# Patient Record
Sex: FEMALE | Race: WHITE | NOT HISPANIC OR LATINO | Employment: FULL TIME | URBAN - METROPOLITAN AREA
[De-identification: names, ages, dates, MRNs, and addresses within clinical notes are randomized per-mention and may not be internally consistent; named-entity substitution may affect disease eponyms.]

---

## 2017-12-06 LAB — RAPID GROUP A STREP (OHS): NEGATIVE

## 2017-12-13 LAB
INFLUENZA A ANTIGEN, POC: NEGATIVE
INFLUENZA B ANTIGEN, POC: NEGATIVE

## 2020-02-11 LAB
INFLUENZA A ANTIGEN, POC: NEGATIVE
INFLUENZA B ANTIGEN, POC: NEGATIVE

## 2020-09-23 LAB — RAPID GROUP A STREP (OHS): NEGATIVE

## 2020-09-25 ENCOUNTER — HISTORICAL (OUTPATIENT)
Dept: URGENT CARE | Facility: CLINIC | Age: 35
End: 2020-09-25

## 2020-11-09 ENCOUNTER — HISTORICAL (OUTPATIENT)
Dept: URGENT CARE | Facility: CLINIC | Age: 35
End: 2020-11-09

## 2020-11-09 LAB
BILIRUB SERPL-MCNC: NEGATIVE MG/DL
BLOOD URINE, POC: NEGATIVE
CLARITY, POC UA: NORMAL
COLOR, POC UA: YELLOW
KETONES UR QL STRIP: NEGATIVE
LEUKOCYTE EST, POC UA: NORMAL
NITRITE, POC UA: NEGATIVE
PH, POC UA: 5
PROTEIN, POC: NORMAL
SPECIFIC GRAVITY, POC UA: 1.02
UROBILINOGEN, POC UA: NORMAL

## 2020-11-11 LAB — FINAL CULTURE: NORMAL

## 2021-06-17 LAB
BILIRUB SERPL-MCNC: NORMAL MG/DL
BLOOD URINE, POC: NORMAL
CLARITY, POC UA: NORMAL
COLOR, POC UA: NORMAL
GLUCOSE UR QL STRIP: NEGATIVE
KETONES UR QL STRIP: NEGATIVE
LEUKOCYTE EST, POC UA: NORMAL
NITRITE, POC UA: POSITIVE
PH, POC UA: 5
PROTEIN, POC: NORMAL
SPECIFIC GRAVITY, POC UA: 1.03
UROBILINOGEN, POC UA: NORMAL

## 2021-06-18 ENCOUNTER — HISTORICAL (OUTPATIENT)
Dept: URGENT CARE | Facility: CLINIC | Age: 36
End: 2021-06-18

## 2021-12-06 LAB
INFLUENZA A ANTIGEN, POC: NEGATIVE
INFLUENZA B ANTIGEN, POC: NEGATIVE
SARS-COV-2 RNA RESP QL NAA+PROBE: NEGATIVE

## 2022-04-10 ENCOUNTER — HISTORICAL (OUTPATIENT)
Dept: ADMINISTRATIVE | Facility: HOSPITAL | Age: 37
End: 2022-04-10

## 2022-04-29 VITALS
HEIGHT: 60 IN | BODY MASS INDEX: 45.14 KG/M2 | DIASTOLIC BLOOD PRESSURE: 83 MMHG | SYSTOLIC BLOOD PRESSURE: 112 MMHG | OXYGEN SATURATION: 97 % | WEIGHT: 229.94 LBS

## 2022-06-01 ENCOUNTER — OFFICE VISIT (OUTPATIENT)
Dept: URGENT CARE | Facility: CLINIC | Age: 37
End: 2022-06-01
Payer: COMMERCIAL

## 2022-06-01 VITALS
HEART RATE: 75 BPM | WEIGHT: 220 LBS | TEMPERATURE: 98 F | HEIGHT: 65 IN | DIASTOLIC BLOOD PRESSURE: 93 MMHG | OXYGEN SATURATION: 97 % | BODY MASS INDEX: 36.65 KG/M2 | SYSTOLIC BLOOD PRESSURE: 125 MMHG | RESPIRATION RATE: 18 BRPM

## 2022-06-01 DIAGNOSIS — J01.90 ACUTE SINUSITIS, RECURRENCE NOT SPECIFIED, UNSPECIFIED LOCATION: ICD-10-CM

## 2022-06-01 DIAGNOSIS — R05.9 COUGH: Primary | ICD-10-CM

## 2022-06-01 LAB
CTP QC/QA: YES
CTP QC/QA: YES
FLUAV AG NPH QL: NEGATIVE
FLUBV AG NPH QL: NEGATIVE
SARS-COV-2 RDRP RESP QL NAA+PROBE: NEGATIVE

## 2022-06-01 PROCEDURE — 3074F SYST BP LT 130 MM HG: CPT | Mod: CPTII,,, | Performed by: PHYSICIAN ASSISTANT

## 2022-06-01 PROCEDURE — 3008F BODY MASS INDEX DOCD: CPT | Mod: CPTII,,, | Performed by: PHYSICIAN ASSISTANT

## 2022-06-01 PROCEDURE — 3008F PR BODY MASS INDEX (BMI) DOCUMENTED: ICD-10-PCS | Mod: CPTII,,, | Performed by: PHYSICIAN ASSISTANT

## 2022-06-01 PROCEDURE — U0002: ICD-10-PCS | Mod: QW,,, | Performed by: PHYSICIAN ASSISTANT

## 2022-06-01 PROCEDURE — 1159F MED LIST DOCD IN RCRD: CPT | Mod: CPTII,,, | Performed by: PHYSICIAN ASSISTANT

## 2022-06-01 PROCEDURE — 3080F DIAST BP >= 90 MM HG: CPT | Mod: CPTII,,, | Performed by: PHYSICIAN ASSISTANT

## 2022-06-01 PROCEDURE — 87804 INFLUENZA ASSAY W/OPTIC: CPT | Mod: QW,,, | Performed by: PHYSICIAN ASSISTANT

## 2022-06-01 PROCEDURE — 99213 OFFICE O/P EST LOW 20 MIN: CPT | Mod: 25,,, | Performed by: PHYSICIAN ASSISTANT

## 2022-06-01 PROCEDURE — 1159F PR MEDICATION LIST DOCUMENTED IN MEDICAL RECORD: ICD-10-PCS | Mod: CPTII,,, | Performed by: PHYSICIAN ASSISTANT

## 2022-06-01 PROCEDURE — 1160F RVW MEDS BY RX/DR IN RCRD: CPT | Mod: CPTII,,, | Performed by: PHYSICIAN ASSISTANT

## 2022-06-01 PROCEDURE — 1160F PR REVIEW ALL MEDS BY PRESCRIBER/CLIN PHARMACIST DOCUMENTED: ICD-10-PCS | Mod: CPTII,,, | Performed by: PHYSICIAN ASSISTANT

## 2022-06-01 PROCEDURE — 87804 POCT INFLUENZA A/B: ICD-10-PCS | Mod: QW,,, | Performed by: PHYSICIAN ASSISTANT

## 2022-06-01 PROCEDURE — 3080F PR MOST RECENT DIASTOLIC BLOOD PRESSURE >= 90 MM HG: ICD-10-PCS | Mod: CPTII,,, | Performed by: PHYSICIAN ASSISTANT

## 2022-06-01 PROCEDURE — 99213 PR OFFICE/OUTPT VISIT, EST, LEVL III, 20-29 MIN: ICD-10-PCS | Mod: 25,,, | Performed by: PHYSICIAN ASSISTANT

## 2022-06-01 PROCEDURE — U0002 COVID-19 LAB TEST NON-CDC: HCPCS | Mod: QW,,, | Performed by: PHYSICIAN ASSISTANT

## 2022-06-01 PROCEDURE — 3074F PR MOST RECENT SYSTOLIC BLOOD PRESSURE < 130 MM HG: ICD-10-PCS | Mod: CPTII,,, | Performed by: PHYSICIAN ASSISTANT

## 2022-06-01 RX ORDER — DOXYCYCLINE 100 MG/1
100 CAPSULE ORAL EVERY 12 HOURS
Qty: 20 CAPSULE | Refills: 0 | Status: SHIPPED | OUTPATIENT
Start: 2022-06-01 | End: 2022-06-11

## 2022-06-01 RX ORDER — PANTOPRAZOLE SODIUM 40 MG/1
40 TABLET, DELAYED RELEASE ORAL EVERY MORNING
COMMUNITY
Start: 2022-03-28

## 2022-06-01 RX ORDER — THYROID, PORCINE 90 MG/1
90 TABLET ORAL DAILY
COMMUNITY
Start: 2022-03-14

## 2022-06-01 NOTE — PROGRESS NOTES
"Subjective:       Patient ID: Jeanna Escobedo is a 37 y.o. female.    Vitals:  height is 5' 5" (1.651 m) and weight is 99.8 kg (220 lb). Her oral temperature is 97.9 °F (36.6 °C). Her blood pressure is 125/93 (abnormal) and her pulse is 75. Her respiration is 18 and oxygen saturation is 97%.     Chief Complaint: Cough, Otalgia (On right side), and Nasal Congestion    Patient is a 37-year-old female complains of 1 week history of nasal congestion and cough followed by a few day history of right-sided otalgia.  Patient is without relief over-the-counter remedies.  Denies any fever neck stiffness rash or shortness of breath    Cough  This is a new problem. The current episode started in the past 7 days. The problem has been gradually worsening. Associated symptoms include ear congestion and ear pain. Pertinent negatives include no fever, sore throat or shortness of breath. Nothing aggravates the symptoms. The treatment provided no relief.   Otalgia   There is pain in the right ear. This is a new problem. The current episode started in the past 7 days. The problem occurs constantly. The problem has been unchanged. There has been no fever. Associated symptoms include coughing and ear discharge. Pertinent negatives include no diarrhea or sore throat. The treatment provided no relief.       Constitution: Negative for fever.   HENT: Positive for ear pain and ear discharge. Negative for sore throat.    Respiratory: Positive for cough. Negative for shortness of breath.    Gastrointestinal: Negative for diarrhea.       Objective:      Physical Exam   Constitutional: She is oriented to person, place, and time. She appears well-developed. She is cooperative.  Non-toxic appearance. She does not appear ill. No distress.   HENT:   Head: Normocephalic and atraumatic.   Ears:   Right Ear: Hearing, tympanic membrane, external ear and ear canal normal.   Left Ear: Hearing, tympanic membrane, external ear and ear canal normal. "   Nose: Nose normal. No mucosal edema, rhinorrhea or nasal deformity. No epistaxis. Right sinus exhibits no maxillary sinus tenderness and no frontal sinus tenderness. Left sinus exhibits no maxillary sinus tenderness and no frontal sinus tenderness.   Mouth/Throat: Uvula is midline, oropharynx is clear and moist and mucous membranes are normal. No trismus in the jaw. Normal dentition. No uvula swelling. No oropharyngeal exudate, posterior oropharyngeal edema or posterior oropharyngeal erythema.   Eyes: Conjunctivae and lids are normal. No scleral icterus.   Neck: Trachea normal and phonation normal. Neck supple. No edema present. No erythema present. No neck rigidity present.   Cardiovascular: Normal rate, regular rhythm, normal heart sounds and normal pulses.   Pulmonary/Chest: Effort normal and breath sounds normal. No respiratory distress. She has no decreased breath sounds. She has no rhonchi.   Abdominal: Normal appearance.   Musculoskeletal: Normal range of motion.         General: No deformity. Normal range of motion.   Neurological: She is alert and oriented to person, place, and time. She exhibits normal muscle tone. Coordination normal.   Skin: Skin is warm, dry, intact, not diaphoretic and not pale.   Psychiatric: Her speech is normal and behavior is normal. Judgment and thought content normal.   Nursing note and vitals reviewed.         Previous History      Review of patient's allergies indicates:   Allergen Reactions    Omeprazole Itching and Swelling    Codeine Itching    Nitrofurantoin Hives and Itching    Nitrofurantoin macrocrystal     Penicillins Itching    Tramadol Itching       Past Medical History:   Diagnosis Date    Disorder of thyroid, unspecified      Current Outpatient Medications   Medication Instructions    ARMOUR THYROID 90 mg, Oral, Daily    doxycycline (MONODOX) 100 mg, Oral, Every 12 hours    pantoprazole (PROTONIX) 40 mg, Oral, Every morning     History reviewed. No  "pertinent surgical history.  Family History   Problem Relation Age of Onset    No Known Problems Mother     No Known Problems Father        Social History     Tobacco Use    Smoking status: Never Smoker    Smokeless tobacco: Never Used   Substance Use Topics    Alcohol use: Never    Drug use: Never        Physical Exam      Vital Signs Reviewed   BP (!) 125/93   Pulse 75   Temp 97.9 °F (36.6 °C) (Oral)   Resp 18   Ht 5' 5" (1.651 m)   Wt 99.8 kg (220 lb)   SpO2 97%   BMI 36.61 kg/m²        Procedures    Procedures     Labs     Results for orders placed or performed in visit on 06/01/22   POCT COVID-19 Rapid Screening   Result Value Ref Range    POC Rapid COVID Negative Negative     Acceptable Yes    POCT Influenza A/B   Result Value Ref Range    Rapid Influenza A Ag Negative Negative    Rapid Influenza B Ag Negative Negative     Acceptable Yes          Assessment:       1. Cough    2. Acute sinusitis, recurrence not specified, unspecified location          Plan:         Cough  -     POCT COVID-19 Rapid Screening  -     POCT Influenza A/B    Acute sinusitis, recurrence not specified, unspecified location  -     doxycycline (MONODOX) 100 MG capsule; Take 1 capsule (100 mg total) by mouth every 12 (twelve) hours. for 10 days  Dispense: 20 capsule; Refill: 0    Drink plenty of fluids    Get plenty of rest.    Follow-up with your primary care doctor      Go to emergency department with any significant change or worsening symptoms.    Tylenol or Motrin as needed for fever.                 "

## 2022-06-01 NOTE — PATIENT INSTRUCTIONS
Drink plenty of fluids    Get plenty of rest.    Follow-up with your primary care doctor      Go to emergency department with any significant change or worsening symptoms.    Tylenol or Motrin as needed for fever.

## 2022-06-01 NOTE — LETTER
June 1, 2022      [facility]  [address]  [place] [address]  Phone: [phone]       Patient: Jeanna Escobedo   YOB: 1985  Date of Visit: 06/01/2022    To Whom It May Concern:    Chris Escobedo  was at [facility] on 06/01/2022. The patient may return to work/school on 06/02/2022 without restrictions. If you have any questions or concerns, or if I can be of further assistance, please do not hesitate to contact me.    Sincerely,    Suha Mobley MA

## 2022-07-18 ENCOUNTER — OFFICE VISIT (OUTPATIENT)
Dept: URGENT CARE | Facility: CLINIC | Age: 37
End: 2022-07-18
Payer: COMMERCIAL

## 2022-07-18 VITALS
WEIGHT: 250 LBS | HEIGHT: 60 IN | SYSTOLIC BLOOD PRESSURE: 108 MMHG | HEART RATE: 55 BPM | TEMPERATURE: 98 F | DIASTOLIC BLOOD PRESSURE: 75 MMHG | OXYGEN SATURATION: 98 % | BODY MASS INDEX: 49.08 KG/M2 | RESPIRATION RATE: 18 BRPM

## 2022-07-18 DIAGNOSIS — N39.0 URINARY TRACT INFECTION WITHOUT HEMATURIA, SITE UNSPECIFIED: ICD-10-CM

## 2022-07-18 DIAGNOSIS — R30.0 DYSURIA: Primary | ICD-10-CM

## 2022-07-18 LAB
BILIRUB UR QL STRIP: NEGATIVE
GLUCOSE UR QL STRIP: NEGATIVE
KETONES UR QL STRIP: NEGATIVE
LEUKOCYTE ESTERASE UR QL STRIP: POSITIVE
PH, POC UA: 7
POC BLOOD, URINE: NEGATIVE
POC NITRATES, URINE: NEGATIVE
PROT UR QL STRIP: NEGATIVE
SP GR UR STRIP: 1 (ref 1–1.03)
UROBILINOGEN UR STRIP-ACNC: 0.5 (ref 0.1–1.1)

## 2022-07-18 PROCEDURE — 1160F RVW MEDS BY RX/DR IN RCRD: CPT | Mod: CPTII,,, | Performed by: PHYSICIAN ASSISTANT

## 2022-07-18 PROCEDURE — 1160F PR REVIEW ALL MEDS BY PRESCRIBER/CLIN PHARMACIST DOCUMENTED: ICD-10-PCS | Mod: CPTII,,, | Performed by: PHYSICIAN ASSISTANT

## 2022-07-18 PROCEDURE — 99213 OFFICE O/P EST LOW 20 MIN: CPT | Mod: ,,, | Performed by: PHYSICIAN ASSISTANT

## 2022-07-18 PROCEDURE — 87088 URINE BACTERIA CULTURE: CPT | Performed by: PHYSICIAN ASSISTANT

## 2022-07-18 PROCEDURE — 3008F BODY MASS INDEX DOCD: CPT | Mod: CPTII,,, | Performed by: PHYSICIAN ASSISTANT

## 2022-07-18 PROCEDURE — 1159F MED LIST DOCD IN RCRD: CPT | Mod: CPTII,,, | Performed by: PHYSICIAN ASSISTANT

## 2022-07-18 PROCEDURE — 81003 URINALYSIS AUTO W/O SCOPE: CPT | Mod: QW,,, | Performed by: PHYSICIAN ASSISTANT

## 2022-07-18 PROCEDURE — 99213 PR OFFICE/OUTPT VISIT, EST, LEVL III, 20-29 MIN: ICD-10-PCS | Mod: ,,, | Performed by: PHYSICIAN ASSISTANT

## 2022-07-18 PROCEDURE — 3078F DIAST BP <80 MM HG: CPT | Mod: CPTII,,, | Performed by: PHYSICIAN ASSISTANT

## 2022-07-18 PROCEDURE — 3078F PR MOST RECENT DIASTOLIC BLOOD PRESSURE < 80 MM HG: ICD-10-PCS | Mod: CPTII,,, | Performed by: PHYSICIAN ASSISTANT

## 2022-07-18 PROCEDURE — 3008F PR BODY MASS INDEX (BMI) DOCUMENTED: ICD-10-PCS | Mod: CPTII,,, | Performed by: PHYSICIAN ASSISTANT

## 2022-07-18 PROCEDURE — 3074F SYST BP LT 130 MM HG: CPT | Mod: CPTII,,, | Performed by: PHYSICIAN ASSISTANT

## 2022-07-18 PROCEDURE — 1159F PR MEDICATION LIST DOCUMENTED IN MEDICAL RECORD: ICD-10-PCS | Mod: CPTII,,, | Performed by: PHYSICIAN ASSISTANT

## 2022-07-18 PROCEDURE — 3074F PR MOST RECENT SYSTOLIC BLOOD PRESSURE < 130 MM HG: ICD-10-PCS | Mod: CPTII,,, | Performed by: PHYSICIAN ASSISTANT

## 2022-07-18 PROCEDURE — 81003 POCT URINALYSIS, DIPSTICK, AUTOMATED, W/O SCOPE: ICD-10-PCS | Mod: QW,,, | Performed by: PHYSICIAN ASSISTANT

## 2022-07-18 RX ORDER — SULFAMETHOXAZOLE AND TRIMETHOPRIM 800; 160 MG/1; MG/1
1 TABLET ORAL 2 TIMES DAILY
Qty: 10 TABLET | Refills: 0 | Status: SHIPPED | OUTPATIENT
Start: 2022-07-18 | End: 2022-07-23

## 2022-07-18 RX ORDER — PHENAZOPYRIDINE HYDROCHLORIDE 200 MG/1
200 TABLET, FILM COATED ORAL 3 TIMES DAILY PRN
Qty: 9 TABLET | Refills: 0 | Status: SHIPPED | OUTPATIENT
Start: 2022-07-18 | End: 2022-07-21

## 2022-07-18 NOTE — PROGRESS NOTES
Subjective:       Patient ID: Jeanna Escobedo is a 37 y.o. female.    Vitals:  height is 5' (1.524 m) and weight is 113.4 kg (250 lb). Her oral temperature is 98.3 °F (36.8 °C). Her blood pressure is 108/75 and her pulse is 55 (abnormal). Her respiration is 18 and oxygen saturation is 98%.     Chief Complaint: Dysuria    Patient is having burning and frequent urination. Started this morning.  Denies any nausea vomiting flank pain bowel changes or vaginal symptoms.  Patient does have a history of recurrent urinary tract infections.    Dysuria   There has been no fever. Associated symptoms include frequency.       Genitourinary: Positive for dysuria and frequency.       Objective:      Physical Exam   Constitutional: She is oriented to person, place, and time. She appears well-developed.   HENT:   Head: Normocephalic and atraumatic.   Ears:   Right Ear: External ear normal.   Left Ear: External ear normal.   Nose: Nose normal. No nasal deformity. No epistaxis.   Mouth/Throat: Oropharynx is clear and moist and mucous membranes are normal.   Eyes: Lids are normal.   Neck: Trachea normal and phonation normal. Neck supple.   Cardiovascular: Normal pulses.   Pulmonary/Chest: Effort normal.   Abdominal: Normal appearance and bowel sounds are normal. She exhibits no distension. Soft. There is no abdominal tenderness. There is no left CVA tenderness and no right CVA tenderness.   Neurological: She is alert and oriented to person, place, and time.   Skin: Skin is warm, dry and intact.   Psychiatric: Her speech is normal and behavior is normal.   Nursing note and vitals reviewed.           Previous History      Review of patient's allergies indicates:   Allergen Reactions    Omeprazole Itching and Swelling    Codeine Itching    Nitrofurantoin Hives and Itching    Nitrofurantoin macrocrystal     Penicillins Itching    Tramadol Itching       Past Medical History:   Diagnosis Date    Disorder of thyroid, unspecified       Current Outpatient Medications   Medication Instructions    ARMOUR THYROID 90 mg, Oral, Daily    pantoprazole (PROTONIX) 40 mg, Oral, Every morning    phenazopyridine (PYRIDIUM) 200 mg, Oral, 3 times daily PRN    sulfamethoxazole-trimethoprim 800-160mg (BACTRIM DS) 800-160 mg Tab 1 tablet, Oral, 2 times daily     History reviewed. No pertinent surgical history.  Family History   Problem Relation Age of Onset    No Known Problems Mother     No Known Problems Father        Social History     Tobacco Use    Smoking status: Never Smoker    Smokeless tobacco: Never Used   Substance Use Topics    Alcohol use: Never    Drug use: Never        Physical Exam      Vital Signs Reviewed   /75   Pulse (!) 55   Temp 98.3 °F (36.8 °C) (Oral)   Resp 18   Ht 5' (1.524 m)   Wt 113.4 kg (250 lb)   SpO2 98%   BMI 48.82 kg/m²        Procedures    Procedures     Labs     Results for orders placed or performed in visit on 07/18/22   POCT Urinalysis, Dipstick, Automated, W/O Scope   Result Value Ref Range    POC Blood, Urine Negative Negative    POC Bilirubin, Urine Negative Negative    POC Urobilinogen, Urine 0.5 0.1 - 1.1    POC Ketones, Urine Negative Negative    POC Protein, Urine Negative Negative    POC Nitrates, Urine Negative Negative    POC Glucose, Urine Negative Negative    pH, UA 7     POC Specific Gravity, Urine 1.005 1.003 - 1.029    POC Leukocytes, Urine Positive (A) Negative         Assessment:       1. Dysuria    2. Urinary tract infection without hematuria, site unspecified          Plan:         Dysuria  -     POCT Urinalysis, Dipstick, Automated, W/O Scope  -     Urine culture  -     sulfamethoxazole-trimethoprim 800-160mg (BACTRIM DS) 800-160 mg Tab; Take 1 tablet by mouth 2 (two) times daily. for 5 days  Dispense: 10 tablet; Refill: 0  -     phenazopyridine (PYRIDIUM) 200 MG tablet; Take 1 tablet (200 mg total) by mouth 3 (three) times daily as needed for Pain.  Dispense: 9 tablet; Refill:  0    Urinary tract infection without hematuria, site unspecified  -     Urine culture  -     sulfamethoxazole-trimethoprim 800-160mg (BACTRIM DS) 800-160 mg Tab; Take 1 tablet by mouth 2 (two) times daily. for 5 days  Dispense: 10 tablet; Refill: 0  -     phenazopyridine (PYRIDIUM) 200 MG tablet; Take 1 tablet (200 mg total) by mouth 3 (three) times daily as needed for Pain.  Dispense: 9 tablet; Refill: 0    Drink plenty of fluids  Get plenty of rest    Follow up with your primary care doctor.  Go to the emergency department with any significant change or worsening of symptoms.

## 2022-07-18 NOTE — PATIENT INSTRUCTIONS
Drink plenty of fluids  Get plenty of rest    Follow up with your primary care doctor.  Go to the emergency department with any significant change or worsening of symptoms.    
[facility]

## 2022-07-20 LAB — BACTERIA UR CULT: NORMAL

## 2022-09-02 ENCOUNTER — OFFICE VISIT (OUTPATIENT)
Dept: URGENT CARE | Facility: CLINIC | Age: 37
End: 2022-09-02
Payer: COMMERCIAL

## 2022-09-02 VITALS
BODY MASS INDEX: 47.12 KG/M2 | DIASTOLIC BLOOD PRESSURE: 72 MMHG | HEART RATE: 71 BPM | TEMPERATURE: 98 F | HEIGHT: 60 IN | WEIGHT: 240 LBS | RESPIRATION RATE: 20 BRPM | OXYGEN SATURATION: 97 % | SYSTOLIC BLOOD PRESSURE: 116 MMHG

## 2022-09-02 DIAGNOSIS — J02.9 SORE THROAT: ICD-10-CM

## 2022-09-02 DIAGNOSIS — R05.9 COUGH: Primary | ICD-10-CM

## 2022-09-02 DIAGNOSIS — U07.1 COVID-19: ICD-10-CM

## 2022-09-02 LAB
CTP QC/QA: YES
CTP QC/QA: YES
S PYO RRNA THROAT QL PROBE: NEGATIVE
SARS-COV-2 RDRP RESP QL NAA+PROBE: POSITIVE

## 2022-09-02 PROCEDURE — 3074F PR MOST RECENT SYSTOLIC BLOOD PRESSURE < 130 MM HG: ICD-10-PCS | Mod: CPTII,,, | Performed by: PHYSICIAN ASSISTANT

## 2022-09-02 PROCEDURE — U0002 COVID-19 LAB TEST NON-CDC: HCPCS | Mod: QW,,, | Performed by: PHYSICIAN ASSISTANT

## 2022-09-02 PROCEDURE — 87880 POCT RAPID STREP A: ICD-10-PCS | Mod: QW,,, | Performed by: PHYSICIAN ASSISTANT

## 2022-09-02 PROCEDURE — 3074F SYST BP LT 130 MM HG: CPT | Mod: CPTII,,, | Performed by: PHYSICIAN ASSISTANT

## 2022-09-02 PROCEDURE — 1159F MED LIST DOCD IN RCRD: CPT | Mod: CPTII,,, | Performed by: PHYSICIAN ASSISTANT

## 2022-09-02 PROCEDURE — 99213 OFFICE O/P EST LOW 20 MIN: CPT | Mod: ,,, | Performed by: PHYSICIAN ASSISTANT

## 2022-09-02 PROCEDURE — 3008F PR BODY MASS INDEX (BMI) DOCUMENTED: ICD-10-PCS | Mod: CPTII,,, | Performed by: PHYSICIAN ASSISTANT

## 2022-09-02 PROCEDURE — 1160F PR REVIEW ALL MEDS BY PRESCRIBER/CLIN PHARMACIST DOCUMENTED: ICD-10-PCS | Mod: CPTII,,, | Performed by: PHYSICIAN ASSISTANT

## 2022-09-02 PROCEDURE — 99213 PR OFFICE/OUTPT VISIT, EST, LEVL III, 20-29 MIN: ICD-10-PCS | Mod: ,,, | Performed by: PHYSICIAN ASSISTANT

## 2022-09-02 PROCEDURE — 3078F PR MOST RECENT DIASTOLIC BLOOD PRESSURE < 80 MM HG: ICD-10-PCS | Mod: CPTII,,, | Performed by: PHYSICIAN ASSISTANT

## 2022-09-02 PROCEDURE — 1160F RVW MEDS BY RX/DR IN RCRD: CPT | Mod: CPTII,,, | Performed by: PHYSICIAN ASSISTANT

## 2022-09-02 PROCEDURE — 3078F DIAST BP <80 MM HG: CPT | Mod: CPTII,,, | Performed by: PHYSICIAN ASSISTANT

## 2022-09-02 PROCEDURE — 3008F BODY MASS INDEX DOCD: CPT | Mod: CPTII,,, | Performed by: PHYSICIAN ASSISTANT

## 2022-09-02 PROCEDURE — 87880 STREP A ASSAY W/OPTIC: CPT | Mod: QW,,, | Performed by: PHYSICIAN ASSISTANT

## 2022-09-02 PROCEDURE — 1159F PR MEDICATION LIST DOCUMENTED IN MEDICAL RECORD: ICD-10-PCS | Mod: CPTII,,, | Performed by: PHYSICIAN ASSISTANT

## 2022-09-02 PROCEDURE — U0002: ICD-10-PCS | Mod: QW,,, | Performed by: PHYSICIAN ASSISTANT

## 2022-09-02 NOTE — LETTER
September 2, 2022      [facility] at [facility]  [address]  [place] [address]  Phone: [phone]       Patient: Jeanna Escobedo   YOB: 1985  Date of Visit: 09/02/2022    To Whom It May Concern:    Chris Escobedo  was at [facility] on 09/02/2022. The patient may return to work/school on 9/8/2022 with no restrictions. If you have any questions or concerns, or if I can be of further assistance, please do not hesitate to contact me.    Sincerely,    Cortney Cloud MA

## 2022-09-02 NOTE — PROGRESS NOTES
Subjective:       Patient ID: Jeanna Escobedo is a 37 y.o. female.    Vitals:  height is 5' (1.524 m) and weight is 108.9 kg (240 lb). Her temperature is 98.2 °F (36.8 °C). Her blood pressure is 116/72 and her pulse is 71. Her respiration is 20 and oxygen saturation is 97%.     Chief Complaint: Cough    X lastnight- chiils, fever, body aches, sore throat, cough .  Denies any neck stiffness rash shortness of breath GI symptoms.  ROS    Objective:      Physical Exam   Constitutional: She is oriented to person, place, and time. She appears well-developed. She is cooperative.  Non-toxic appearance. She does not appear ill. No distress.   HENT:   Head: Normocephalic and atraumatic.   Ears:   Right Ear: Hearing normal.   Left Ear: Hearing normal.   Eyes: Conjunctivae and lids are normal. No scleral icterus.   Neck: Trachea normal and phonation normal. Neck supple. No edema present. No erythema present. No neck rigidity present.   Cardiovascular: Normal rate, regular rhythm, normal heart sounds and normal pulses.   Pulmonary/Chest: Effort normal and breath sounds normal. No respiratory distress. She has no decreased breath sounds. She has no rhonchi.   Abdominal: Normal appearance.   Musculoskeletal: Normal range of motion.         General: No deformity. Normal range of motion.   Neurological: She is alert and oriented to person, place, and time. She exhibits normal muscle tone. Coordination normal.   Skin: Skin is warm, dry, intact, not diaphoretic and not pale.   Psychiatric: Her speech is normal and behavior is normal. Judgment and thought content normal.   Nursing note and vitals reviewed.             Previous History      Review of patient's allergies indicates:   Allergen Reactions    Omeprazole Itching and Swelling    Codeine Itching    Nitrofurantoin Hives and Itching    Nitrofurantoin macrocrystal     Penicillins Itching    Tramadol Itching       Past Medical History:   Diagnosis Date    Disorder of thyroid,  unspecified     GERD (gastroesophageal reflux disease)      Current Outpatient Medications   Medication Instructions    ARMOUR THYROID 90 mg, Oral, Daily    nirmatrelvir-ritonavir 300 mg (150 mg x 2)-100 mg copackaged tablets (EUA) Take 3 tablets by mouth 2 (two) times daily for 5 days. Each dose contains 2 nirmatrelvir (pink tablets) and 1 ritonavir (white tablet). Take all 3 tablets together    pantoprazole (PROTONIX) 40 mg, Oral, Every morning     History reviewed. No pertinent surgical history.  Family History   Problem Relation Age of Onset    No Known Problems Mother     No Known Problems Father        Social History     Tobacco Use    Smoking status: Never    Smokeless tobacco: Never   Substance Use Topics    Alcohol use: Never    Drug use: Never        Physical Exam      Vital Signs Reviewed   /72   Pulse 71   Temp 98.2 °F (36.8 °C)   Resp 20   Ht 5' (1.524 m)   Wt 108.9 kg (240 lb)   LMP  (LMP Unknown) Comment: IUD  SpO2 97%   BMI 46.87 kg/m²        Procedures    Procedures     Labs     Results for orders placed or performed in visit on 09/02/22   POCT COVID-19 Rapid Screening   Result Value Ref Range    POC Rapid COVID Positive (A) Negative     Acceptable Yes    POCT rapid strep A   Result Value Ref Range    Rapid Strep A Screen Negative Negative     Acceptable Yes      Assessment:       1. Cough    2. Sore throat    3. COVID-19          Plan:         Cough  -     POCT COVID-19 Rapid Screening  -     POCT rapid strep A    Sore throat  -     POCT COVID-19 Rapid Screening  -     POCT rapid strep A    COVID-19  -     nirmatrelvir-ritonavir 300 mg (150 mg x 2)-100 mg copackaged tablets (EUA); Take 3 tablets by mouth 2 (two) times daily for 5 days. Each dose contains 2 nirmatrelvir (pink tablets) and 1 ritonavir (white tablet). Take all 3 tablets together  Dispense: 30 tablet; Refill: 0       Drink plenty of fluids    Get plenty of rest.    Follow-up with your primary  care doctor      Go to emergency department with any significant change or worsening symptoms.    Tylenol or Motrin as needed for fever.     Please quarantine for 5 days. On day 6 of illness you can return to work/school as long as you have not experienced fever in the last 24 hours.     Please add vitamin C, vitamin D, and Zinc if you do not already take these.

## 2022-09-05 PROBLEM — J01.90 ACUTE SINUSITIS: Status: RESOLVED | Noted: 2022-06-01 | Resolved: 2022-09-05

## 2022-09-15 ENCOUNTER — HISTORICAL (OUTPATIENT)
Dept: ADMINISTRATIVE | Facility: HOSPITAL | Age: 37
End: 2022-09-15
Payer: COMMERCIAL

## 2022-09-16 ENCOUNTER — HISTORICAL (OUTPATIENT)
Dept: ADMINISTRATIVE | Facility: HOSPITAL | Age: 37
End: 2022-09-16
Payer: COMMERCIAL

## 2022-10-02 ENCOUNTER — OFFICE VISIT (OUTPATIENT)
Dept: URGENT CARE | Facility: CLINIC | Age: 37
End: 2022-10-02
Payer: COMMERCIAL

## 2022-10-02 VITALS
SYSTOLIC BLOOD PRESSURE: 134 MMHG | BODY MASS INDEX: 48.1 KG/M2 | TEMPERATURE: 98 F | HEART RATE: 82 BPM | HEIGHT: 60 IN | DIASTOLIC BLOOD PRESSURE: 87 MMHG | OXYGEN SATURATION: 97 % | WEIGHT: 245 LBS | RESPIRATION RATE: 18 BRPM

## 2022-10-02 DIAGNOSIS — J02.9 SORE THROAT: Primary | ICD-10-CM

## 2022-10-02 DIAGNOSIS — J02.9 ACUTE PHARYNGITIS, UNSPECIFIED ETIOLOGY: ICD-10-CM

## 2022-10-02 LAB
CTP QC/QA: YES
S PYO RRNA THROAT QL PROBE: NEGATIVE

## 2022-10-02 PROCEDURE — 1160F PR REVIEW ALL MEDS BY PRESCRIBER/CLIN PHARMACIST DOCUMENTED: ICD-10-PCS | Mod: CPTII,,, | Performed by: PHYSICIAN ASSISTANT

## 2022-10-02 PROCEDURE — 3079F DIAST BP 80-89 MM HG: CPT | Mod: CPTII,,, | Performed by: PHYSICIAN ASSISTANT

## 2022-10-02 PROCEDURE — 87880 POCT RAPID STREP A: ICD-10-PCS | Mod: QW,,, | Performed by: PHYSICIAN ASSISTANT

## 2022-10-02 PROCEDURE — 1159F PR MEDICATION LIST DOCUMENTED IN MEDICAL RECORD: ICD-10-PCS | Mod: CPTII,,, | Performed by: PHYSICIAN ASSISTANT

## 2022-10-02 PROCEDURE — 1160F RVW MEDS BY RX/DR IN RCRD: CPT | Mod: CPTII,,, | Performed by: PHYSICIAN ASSISTANT

## 2022-10-02 PROCEDURE — 3079F PR MOST RECENT DIASTOLIC BLOOD PRESSURE 80-89 MM HG: ICD-10-PCS | Mod: CPTII,,, | Performed by: PHYSICIAN ASSISTANT

## 2022-10-02 PROCEDURE — 1159F MED LIST DOCD IN RCRD: CPT | Mod: CPTII,,, | Performed by: PHYSICIAN ASSISTANT

## 2022-10-02 PROCEDURE — 3008F PR BODY MASS INDEX (BMI) DOCUMENTED: ICD-10-PCS | Mod: CPTII,,, | Performed by: PHYSICIAN ASSISTANT

## 2022-10-02 PROCEDURE — 3008F BODY MASS INDEX DOCD: CPT | Mod: CPTII,,, | Performed by: PHYSICIAN ASSISTANT

## 2022-10-02 PROCEDURE — 87880 STREP A ASSAY W/OPTIC: CPT | Mod: QW,,, | Performed by: PHYSICIAN ASSISTANT

## 2022-10-02 PROCEDURE — 99213 PR OFFICE/OUTPT VISIT, EST, LEVL III, 20-29 MIN: ICD-10-PCS | Mod: ,,, | Performed by: PHYSICIAN ASSISTANT

## 2022-10-02 PROCEDURE — 3075F PR MOST RECENT SYSTOLIC BLOOD PRESS GE 130-139MM HG: ICD-10-PCS | Mod: CPTII,,, | Performed by: PHYSICIAN ASSISTANT

## 2022-10-02 PROCEDURE — 99213 OFFICE O/P EST LOW 20 MIN: CPT | Mod: ,,, | Performed by: PHYSICIAN ASSISTANT

## 2022-10-02 PROCEDURE — 3075F SYST BP GE 130 - 139MM HG: CPT | Mod: CPTII,,, | Performed by: PHYSICIAN ASSISTANT

## 2022-10-02 RX ORDER — ASCORBIC ACID 500 MG
500 TABLET ORAL DAILY
COMMUNITY

## 2022-10-02 RX ORDER — CHOLECALCIFEROL (VITAMIN D3) 25 MCG
1000 TABLET ORAL DAILY
COMMUNITY

## 2022-10-02 RX ORDER — DOXYCYCLINE 100 MG/1
100 CAPSULE ORAL EVERY 12 HOURS
Qty: 20 CAPSULE | Refills: 0 | Status: SHIPPED | OUTPATIENT
Start: 2022-10-02 | End: 2022-10-12

## 2022-10-02 NOTE — PROGRESS NOTES
Subjective:       Patient ID: Jeanna Escobedo is a 37 y.o. female.    Vitals:  height is 5' (1.524 m) and weight is 111.1 kg (245 lb). Her temperature is 98.1 °F (36.7 °C). Her blood pressure is 134/87 and her pulse is 82. Her respiration is 18 and oxygen saturation is 97%.     Chief Complaint: Sore Throat    Sore throat, headache, swollen glands starting Wednesday   Pt exposed to strep.  Denies any significant fever coughing neck stiffness rash or GI symptoms.  ROS    Objective:      Physical Exam   Constitutional: She is oriented to person, place, and time. She appears well-developed. She is cooperative.  Non-toxic appearance. She does not appear ill. No distress.   HENT:   Head: Normocephalic and atraumatic.   Ears:   Right Ear: Hearing, tympanic membrane, external ear and ear canal normal.   Left Ear: Hearing, tympanic membrane, external ear and ear canal normal.   Nose: Nose normal. No nasal deformity. No epistaxis.   Mouth/Throat: Uvula is midline and mucous membranes are normal. No trismus in the jaw. Normal dentition. No uvula swelling. Posterior oropharyngeal erythema present. No oropharyngeal exudate or posterior oropharyngeal edema.   Eyes: Conjunctivae and lids are normal. No scleral icterus.   Neck: Trachea normal and phonation normal. Neck supple. No edema present. No erythema present. No neck rigidity present.   Cardiovascular: Normal rate, regular rhythm, normal heart sounds and normal pulses.   Pulmonary/Chest: Effort normal and breath sounds normal. No respiratory distress. She has no decreased breath sounds. She has no rhonchi.   Abdominal: Normal appearance.   Musculoskeletal: Normal range of motion.         General: No deformity. Normal range of motion.   Lymphadenopathy:     She has cervical adenopathy.   Neurological: She is alert and oriented to person, place, and time. She exhibits normal muscle tone. Coordination normal.   Skin: Skin is warm, dry, intact, not diaphoretic and not pale.    Psychiatric: Her speech is normal and behavior is normal. Judgment and thought content normal.   Nursing note and vitals reviewed.             Previous History      Review of patient's allergies indicates:   Allergen Reactions    Omeprazole Itching and Swelling    Codeine Itching    Nitrofurantoin Hives and Itching    Nitrofurantoin macrocrystal     Penicillins Itching    Tramadol Itching       Past Medical History:   Diagnosis Date    Disorder of thyroid, unspecified     GERD (gastroesophageal reflux disease)      Current Outpatient Medications   Medication Instructions    ARMOUR THYROID 90 mg, Oral, Daily    ascorbic acid (vitamin C) (VITAMIN C) 500 mg, Oral, Daily    doxycycline (MONODOX) 100 mg, Oral, Every 12 hours    pantoprazole (PROTONIX) 40 mg, Oral, Every morning    vitamin D (VITAMIN D3) 1,000 Units, Oral, Daily    zinc sulfate (ZINC-220 ORAL) Oral     History reviewed. No pertinent surgical history.  Family History   Problem Relation Age of Onset    No Known Problems Mother     No Known Problems Father        Social History     Tobacco Use    Smoking status: Never    Smokeless tobacco: Never   Substance Use Topics    Alcohol use: Never    Drug use: Never        Physical Exam      Vital Signs Reviewed   /87   Pulse 82   Temp 98.1 °F (36.7 °C)   Resp 18   Ht 5' (1.524 m)   Wt 111.1 kg (245 lb)   LMP  (LMP Unknown) Comment: IUD  SpO2 97%   BMI 47.85 kg/m²        Procedures    Procedures     Labs     Results for orders placed or performed in visit on 10/02/22   POCT rapid strep A   Result Value Ref Range    Rapid Strep A Screen Negative Negative     Acceptable Yes      Assessment:       1. Sore throat    2. Acute pharyngitis, unspecified etiology          Plan:         Sore throat  -     POCT rapid strep A    Acute pharyngitis, unspecified etiology  -     doxycycline (MONODOX) 100 MG capsule; Take 1 capsule (100 mg total) by mouth every 12 (twelve) hours. for 10 days  Dispense:  20 capsule; Refill: 0       Drink plenty of fluids.     Get plenty of rest.     Tylenol or Motrin as needed.     Go to the ER with any significant change or worsening of symptoms.     Follow up with your primary care doctor.

## 2023-03-09 ENCOUNTER — OFFICE VISIT (OUTPATIENT)
Dept: URGENT CARE | Facility: CLINIC | Age: 38
End: 2023-03-09
Payer: COMMERCIAL

## 2023-03-09 VITALS
TEMPERATURE: 98 F | BODY MASS INDEX: 48.1 KG/M2 | HEIGHT: 60 IN | OXYGEN SATURATION: 98 % | SYSTOLIC BLOOD PRESSURE: 125 MMHG | WEIGHT: 245 LBS | RESPIRATION RATE: 18 BRPM | HEART RATE: 67 BPM | DIASTOLIC BLOOD PRESSURE: 85 MMHG

## 2023-03-09 DIAGNOSIS — R09.82 POST-NASAL DRIP: ICD-10-CM

## 2023-03-09 DIAGNOSIS — J06.9 VIRAL URI WITH COUGH: ICD-10-CM

## 2023-03-09 DIAGNOSIS — J02.9 VIRAL PHARYNGITIS: Primary | ICD-10-CM

## 2023-03-09 LAB
CTP QC/QA: YES
CTP QC/QA: YES
MOLECULAR STREP A: NEGATIVE
SARS-COV-2 RDRP RESP QL NAA+PROBE: NEGATIVE

## 2023-03-09 PROCEDURE — 99213 OFFICE O/P EST LOW 20 MIN: CPT | Mod: 25,,,

## 2023-03-09 PROCEDURE — 87635 SARS-COV-2 COVID-19 AMP PRB: CPT | Mod: QW,,,

## 2023-03-09 PROCEDURE — 87651 POCT STREP A MOLECULAR: ICD-10-PCS | Mod: QW,,,

## 2023-03-09 PROCEDURE — 87635: ICD-10-PCS | Mod: QW,,,

## 2023-03-09 PROCEDURE — 87651 STREP A DNA AMP PROBE: CPT | Mod: QW,,,

## 2023-03-09 PROCEDURE — 99213 PR OFFICE/OUTPT VISIT, EST, LEVL III, 20-29 MIN: ICD-10-PCS | Mod: 25,,,

## 2023-03-09 PROCEDURE — 96372 THER/PROPH/DIAG INJ SC/IM: CPT | Mod: ,,,

## 2023-03-09 PROCEDURE — 96372 PR INJECTION,THERAP/PROPH/DIAG2ST, IM OR SUBCUT: ICD-10-PCS | Mod: ,,,

## 2023-03-09 RX ORDER — BETAMETHASONE SODIUM PHOSPHATE AND BETAMETHASONE ACETATE 3; 3 MG/ML; MG/ML
9 INJECTION, SUSPENSION INTRA-ARTICULAR; INTRALESIONAL; INTRAMUSCULAR; SOFT TISSUE
Status: COMPLETED | OUTPATIENT
Start: 2023-03-09 | End: 2023-03-09

## 2023-03-09 RX ADMIN — BETAMETHASONE SODIUM PHOSPHATE AND BETAMETHASONE ACETATE 9 MG: 3; 3 INJECTION, SUSPENSION INTRA-ARTICULAR; INTRALESIONAL; INTRAMUSCULAR; SOFT TISSUE at 09:03

## 2023-03-09 NOTE — PROGRESS NOTES
Subjective:       Patient ID: Jeanna Escobedo is a 37 y.o. female.    Vitals:  height is 5' (1.524 m) and weight is 111.1 kg (245 lb). Her temperature is 98.3 °F (36.8 °C). Her blood pressure is 125/85 and her pulse is 67. Her respiration is 18 and oxygen saturation is 98%.     Chief Complaint: Sore Throat    Patient is a 37-year-old female who presents to clinic with complaints of productive cough with nasal congestion and postnasal drip over the last week.  Patient reports awaking this morning with sore throat and has also developed hoarseness.  Denies fever, body aches, chills, shortness breath, neck stiffness, rash, GI symptoms.    Sore Throat     HENT:  Positive for sore throat.      Objective:      Physical Exam   Constitutional: She is oriented to person, place, and time. She appears well-developed. She is cooperative.  Non-toxic appearance. She does not appear ill. No distress.   HENT:   Head: Normocephalic and atraumatic.   Ears:   Right Ear: Hearing, tympanic membrane, external ear and ear canal normal.   Left Ear: Hearing, tympanic membrane, external ear and ear canal normal.   Nose: Nose normal. No mucosal edema, rhinorrhea or nasal deformity. No epistaxis. Right sinus exhibits no maxillary sinus tenderness and no frontal sinus tenderness. Left sinus exhibits no maxillary sinus tenderness and no frontal sinus tenderness.   Mouth/Throat: Uvula is midline, oropharynx is clear and moist and mucous membranes are normal. Mucous membranes are moist. No trismus in the jaw. Normal dentition. No uvula swelling. Cobblestoning present. No oropharyngeal exudate, posterior oropharyngeal edema or posterior oropharyngeal erythema.      Comments: Cobblestoning, postnasal drip  Eyes: Conjunctivae and lids are normal. No scleral icterus.   Neck: Trachea normal and phonation normal. Neck supple. No edema present. No erythema present. No neck rigidity present.   Cardiovascular: Normal rate, regular rhythm, normal heart  sounds and normal pulses.   Pulmonary/Chest: Effort normal and breath sounds normal. No respiratory distress. She has no decreased breath sounds. She has no rhonchi.   Abdominal: Normal appearance.   Musculoskeletal: Normal range of motion.         General: No deformity. Normal range of motion.   Lymphadenopathy:     She has no cervical adenopathy.   Neurological: She is alert and oriented to person, place, and time. She exhibits normal muscle tone. Coordination normal.   Skin: Skin is warm, dry, intact, not diaphoretic and not pale.   Psychiatric: Her speech is normal and behavior is normal. Judgment and thought content normal.   Nursing note and vitals reviewed.      Assessment:       1. Viral pharyngitis    2. Post-nasal drip    3. Viral URI with cough          Plan:         Viral pharyngitis  -     POCT COVID-19 Rapid Screening  -     POCT Strep A, Molecular  -     betamethasone acetate-betamethasone sodium phosphate injection 9 mg    Post-nasal drip    Viral URI with cough    Negative strep and COVID, likely viral etiology   Patient declines need for prescription cough syrup.     Symptoms persist past 14 days, after 1 week of prescribe treatment, may call back for worry for bacterial infection at that time.     Drink plenty of fluids. Get plenty of rest.   Claritin or Zyrtec 10 mg for nasal congestion.  Nasal spray such as Nasacort or Flonase for congestion.  Warm saltwater gargles for sore throat.  Warm water with honey to help coat the throat. Chloraseptic Spray for worsening sore throat  Tylenol or ibuprofen as needed for sore throat and fever.   Over-the-counter cough and cold medication, decongestants as needed and as directed.  Call or return to clinic as needed   Go to the ER with any significant change or worsening of symptoms.   Follow up with your primary care doctor.

## 2023-03-09 NOTE — PATIENT INSTRUCTIONS
Negative strep and COVID     Drink plenty of fluids. Get plenty of rest.   Claritin or Zyrtec 10 mg for nasal congestion.  Nasal spray such as Nasacort or Flonase for congestion.  Warm saltwater gargles for sore throat.  Warm water with honey to help coat the throat. Chloraseptic Spray for worsening sore throat  Over-the-counter cough and cold medication, decongestants as needed and as directed.    Tylenol or ibuprofen as needed for sore throat and fever.     Call or return to clinic as needed   Go to the ER with any significant change or worsening of symptoms.   Follow up with your primary care doctor.

## 2023-03-13 ENCOUNTER — TELEPHONE (OUTPATIENT)
Dept: URGENT CARE | Facility: CLINIC | Age: 38
End: 2023-03-13
Payer: COMMERCIAL

## 2023-03-13 NOTE — TELEPHONE ENCOUNTER
Patient was seen in clinic on 3/09/23. D w/ viral pharyngitis. Today she is complaining of congestion and no improvement of overall symptoms. She has taken

## 2023-03-13 NOTE — TELEPHONE ENCOUNTER
Patient was seen in clinic on 3/09/23. D w/ viral pharyngitis. Today she is complaining of congestion and no improvement of overall symptoms. She was given Celestone 9mg at visit.She is asking for further recommendation.   Please advise.

## 2023-04-04 ENCOUNTER — OFFICE VISIT (OUTPATIENT)
Dept: URGENT CARE | Facility: CLINIC | Age: 38
End: 2023-04-04
Payer: COMMERCIAL

## 2023-04-04 VITALS
HEART RATE: 71 BPM | RESPIRATION RATE: 18 BRPM | OXYGEN SATURATION: 99 % | HEIGHT: 60 IN | WEIGHT: 245 LBS | BODY MASS INDEX: 48.1 KG/M2 | SYSTOLIC BLOOD PRESSURE: 115 MMHG | DIASTOLIC BLOOD PRESSURE: 84 MMHG | TEMPERATURE: 98 F

## 2023-04-04 DIAGNOSIS — J32.9 SINUSITIS, UNSPECIFIED CHRONICITY, UNSPECIFIED LOCATION: Primary | ICD-10-CM

## 2023-04-04 PROCEDURE — 99213 PR OFFICE/OUTPT VISIT, EST, LEVL III, 20-29 MIN: ICD-10-PCS | Mod: S$PBB,,,

## 2023-04-04 PROCEDURE — 99213 OFFICE O/P EST LOW 20 MIN: CPT | Mod: S$PBB,,,

## 2023-04-04 RX ORDER — PREDNISONE 20 MG/1
30 TABLET ORAL DAILY
Qty: 8 TABLET | Refills: 0 | Status: SHIPPED | OUTPATIENT
Start: 2023-04-04 | End: 2023-04-09

## 2023-04-04 RX ORDER — DOXYCYCLINE 100 MG/1
100 CAPSULE ORAL EVERY 12 HOURS
Qty: 14 CAPSULE | Refills: 0 | Status: SHIPPED | OUTPATIENT
Start: 2023-04-04 | End: 2023-04-11

## 2023-04-04 NOTE — PATIENT INSTRUCTIONS
Start the antibiotic today, take all of it even if symptms improve. Consider starting a probiotic, take with food.   Start taking an allergy pill daily such as claritin, zyrtec, allegrea or xyzal. Also start using a nasal steroid spray such as flonase or nasacort daily. If you are not being treated for high blood pressure, you can also take decongestant such as sudafed as needed. They can be purchased over the counter. Start the oral steroid today. Monitor for fever. Take tylenol/acetaminophen or ibuprofen as needed. Rest and hydrate. If symptoms persist or worsen, return to clinic or seek medical attention immediately.

## 2023-04-04 NOTE — PROGRESS NOTES
Subjective:      Patient ID: Jeanna Escobedo is a 38 y.o. female.    Vitals:  height is 5' (1.524 m) and weight is 111.1 kg (245 lb). Her temperature is 97.7 °F (36.5 °C). Her blood pressure is 115/84 and her pulse is 71. Her respiration is 18 and oxygen saturation is 99%.     Chief Complaint: Otalgia (Ear pain, nasal congestion)     Patient is a 38 y.o.  female who presents to urgent care with complaints of right ear pain in the left ear,fullness in left ear, nasal congestion and dry cough x3 weeks. Alleviating factors include robitussin with mild amount of relief. Patient denies any fever, history of asthma, wheezing, sob, cp, n/v/d, abdominal complaints, rash, difficulty swallowing, neck stiffness, or changes in intake or output.         Otalgia   Associated symptoms include coughing. Pertinent negatives include no ear discharge or sore throat.     HENT:  Positive for ear pain, congestion, sinus pain and sinus pressure. Negative for ear discharge, sore throat, trouble swallowing and voice change.    Eyes: Negative.    Respiratory:  Positive for cough. Negative for sputum production, shortness of breath, wheezing and asthma.    Allergic/Immunologic: Negative for asthma.    Objective:     Physical Exam   Constitutional: She is oriented to person, place, and time. She appears well-developed. She is cooperative.  Non-toxic appearance. She does not appear ill. No distress.   HENT:   Head: Normocephalic and atraumatic.   Ears:   Right Ear: Hearing and external ear normal. A middle ear effusion is present.   Left Ear: Hearing and external ear normal. A middle ear effusion is present.   Nose: Congestion (clear pnd) present.   Mouth/Throat: Mucous membranes are normal. Posterior oropharyngeal erythema present.   Eyes: Conjunctivae and lids are normal.   Neck: Trachea normal and phonation normal. Neck supple. No edema present. No erythema present. No neck rigidity present.   Cardiovascular: Normal rate.    Pulmonary/Chest: Effort normal and breath sounds normal. No stridor. No respiratory distress. She has no decreased breath sounds. She has no wheezes. She has no rhonchi. She has no rales.   Abdominal: Normal appearance.   Neurological: She is alert and oriented to person, place, and time. She exhibits normal muscle tone.   Skin: Skin is warm, dry, intact, not diaphoretic and no rash.   Psychiatric: Her speech is normal and behavior is normal. Mood normal.   Nursing note and vitals reviewed.    Assessment:     1. Sinusitis, unspecified chronicity, unspecified location        Plan:       Sinusitis, unspecified chronicity, unspecified location    Other orders  -     doxycycline (MONODOX) 100 MG capsule; Take 1 capsule (100 mg total) by mouth every 12 (twelve) hours. for 7 days  Dispense: 14 capsule; Refill: 0  -     predniSONE (DELTASONE) 20 MG tablet; Take 1.5 tablets (30 mg total) by mouth once daily. for 5 days  Dispense: 8 tablet; Refill: 0    Start the antibiotic today, take all of it even if symptms improve. Consider starting a probiotic, take with food.   Start taking an allergy pill daily such as claritin, zyrtec, allegrea or xyzal. Also start using a nasal steroid spray such as flonase or nasacort daily. If you are not being treated for high blood pressure, you can also take decongestant such as sudafed as needed. They can be purchased over the counter. Start the oral steroid today. Monitor for fever. Take tylenol/acetaminophen or ibuprofen as needed. Rest and hydrate. If symptoms persist or worsen, return to clinic or seek medical attention immediately.

## 2023-11-01 ENCOUNTER — OFFICE VISIT (OUTPATIENT)
Dept: URGENT CARE | Facility: CLINIC | Age: 38
End: 2023-11-01
Payer: COMMERCIAL

## 2023-11-01 VITALS
SYSTOLIC BLOOD PRESSURE: 118 MMHG | HEART RATE: 79 BPM | OXYGEN SATURATION: 99 % | RESPIRATION RATE: 18 BRPM | TEMPERATURE: 98 F | WEIGHT: 245 LBS | DIASTOLIC BLOOD PRESSURE: 84 MMHG | BODY MASS INDEX: 48.1 KG/M2 | HEIGHT: 60 IN

## 2023-11-01 DIAGNOSIS — J06.9 ACUTE URI: Primary | ICD-10-CM

## 2023-11-01 DIAGNOSIS — R05.9 COUGH, UNSPECIFIED TYPE: ICD-10-CM

## 2023-11-01 LAB
CTP QC/QA: YES
MOLECULAR STREP A: NEGATIVE
POC MOLECULAR INFLUENZA A AGN: NEGATIVE
POC MOLECULAR INFLUENZA B AGN: NEGATIVE
SARS-COV-2 RDRP RESP QL NAA+PROBE: NEGATIVE

## 2023-11-01 PROCEDURE — 87651 STREP A DNA AMP PROBE: CPT | Mod: QW,,, | Performed by: PHYSICIAN ASSISTANT

## 2023-11-01 PROCEDURE — 87635 SARS-COV-2 COVID-19 AMP PRB: CPT | Mod: QW,,, | Performed by: PHYSICIAN ASSISTANT

## 2023-11-01 PROCEDURE — 87635: ICD-10-PCS | Mod: QW,,, | Performed by: PHYSICIAN ASSISTANT

## 2023-11-01 PROCEDURE — 87502 POCT INFLUENZA A/B MOLECULAR: ICD-10-PCS | Mod: QW,,, | Performed by: PHYSICIAN ASSISTANT

## 2023-11-01 PROCEDURE — 99214 PR OFFICE/OUTPT VISIT, EST, LEVL IV, 30-39 MIN: ICD-10-PCS | Mod: ,,, | Performed by: PHYSICIAN ASSISTANT

## 2023-11-01 PROCEDURE — 87651 POCT STREP A MOLECULAR: ICD-10-PCS | Mod: QW,,, | Performed by: PHYSICIAN ASSISTANT

## 2023-11-01 PROCEDURE — 87502 INFLUENZA DNA AMP PROBE: CPT | Mod: QW,,, | Performed by: PHYSICIAN ASSISTANT

## 2023-11-01 PROCEDURE — 99214 OFFICE O/P EST MOD 30 MIN: CPT | Mod: ,,, | Performed by: PHYSICIAN ASSISTANT

## 2023-11-01 RX ORDER — PREDNISONE 20 MG/1
20 TABLET ORAL 2 TIMES DAILY
Qty: 10 TABLET | Refills: 0 | Status: SHIPPED | OUTPATIENT
Start: 2023-11-01 | End: 2023-11-06

## 2023-11-01 NOTE — PROGRESS NOTES
Subjective:      Patient ID: Jeanna Escobedo is a 38 y.o. female.    Vitals:  height is 5' (1.524 m) and weight is 111.1 kg (245 lb). Her oral temperature is 98.3 °F (36.8 °C). Her blood pressure is 118/84 and her pulse is 79. Her respiration is 18 and oxygen saturation is 99%.     Chief Complaint: Sinus Problem     Patient is a 38 y.o. female who presents to urgent care with complaints of nasal congestion, sneezing, cough x1 week. Alleviating factors include robitussin with honey with mild amount of relief. Patient denies fever, sore throat, or sob.      ROS   Objective:     Physical Exam   Constitutional: She is oriented to person, place, and time. She appears well-developed. She is cooperative.  Non-toxic appearance. She does not appear ill. No distress.   HENT:   Head: Normocephalic and atraumatic.   Ears:   Right Ear: Hearing, tympanic membrane, external ear and ear canal normal.   Left Ear: Hearing, tympanic membrane, external ear and ear canal normal.   Nose: Nose normal. No nasal deformity. No epistaxis.   Mouth/Throat: Uvula is midline, oropharynx is clear and moist and mucous membranes are normal. No trismus in the jaw. Normal dentition. No uvula swelling. No oropharyngeal exudate, posterior oropharyngeal edema or posterior oropharyngeal erythema.   Eyes: Conjunctivae and lids are normal. No scleral icterus.   Neck: Trachea normal and phonation normal. Neck supple. No edema present. No erythema present. No neck rigidity present.   Cardiovascular: Normal rate, regular rhythm, normal heart sounds and normal pulses.   Pulmonary/Chest: Effort normal and breath sounds normal. No respiratory distress. She has no decreased breath sounds. She has no rhonchi.   Abdominal: Normal appearance.   Musculoskeletal: Normal range of motion.         General: No deformity. Normal range of motion.   Neurological: She is alert and oriented to person, place, and time. She exhibits normal muscle tone. Coordination normal.    Skin: Skin is warm, dry, intact, not diaphoretic and not pale.   Psychiatric: Her speech is normal and behavior is normal. Judgment and thought content normal.   Nursing note and vitals reviewed.         Previous History      Review of patient's allergies indicates:   Allergen Reactions    Omeprazole Itching and Swelling    Codeine Itching    Nexium [esomeprazole magnesium] Hives    Nitrofurantoin Hives and Itching    Nitrofurantoin macrocrystal     Penicillins Itching    Tramadol Itching       Past Medical History:   Diagnosis Date    Disorder of thyroid, unspecified     GERD (gastroesophageal reflux disease)      Current Outpatient Medications   Medication Instructions    ARMOUR THYROID 90 mg, Oral, Daily    ascorbic acid (vitamin C) (VITAMIN C) 500 mg, Oral, Daily    pantoprazole (PROTONIX) 40 mg, Oral, Every morning    predniSONE (DELTASONE) 20 mg, Oral, 2 times daily    vitamin D (VITAMIN D3) 1,000 Units, Oral, Daily    zinc sulfate (ZINC-220 ORAL) Oral     Past Surgical History:   Procedure Laterality Date    ADENOIDECTOMY      THYROIDECTOMY, PARTIAL      TONSILLECTOMY      ULNAR NERVE REPAIR Right     ULNAR NERVE REPAIR Right      Family History   Problem Relation Age of Onset    No Known Problems Mother     No Known Problems Father        Social History     Tobacco Use    Smoking status: Never    Smokeless tobacco: Never   Substance Use Topics    Alcohol use: Never    Drug use: Never        Physical Exam      Vital Signs Reviewed   /84   Pulse 79   Temp 98.3 °F (36.8 °C) (Oral)   Resp 18   Ht 5' (1.524 m)   Wt 111.1 kg (245 lb)   SpO2 99%   BMI 47.85 kg/m²        Procedures    Procedures     Labs     Results for orders placed or performed in visit on 11/01/23   POCT COVID-19 Rapid Screening   Result Value Ref Range    POC Rapid COVID Negative Negative     Acceptable Yes    POCT Influenza A/B Molecular   Result Value Ref Range    POC Molecular Influenza A Ag Negative Negative,  Not Reported    POC Molecular Influenza B Ag Negative Negative, Not Reported     Acceptable Yes    POCT Strep A, Molecular   Result Value Ref Range    Molecular Strep A, POC Negative Negative     Acceptable Yes      Assessment:     1. Acute URI    2. Cough, unspecified type        Plan:       Acute URI    Cough, unspecified type  -     POCT COVID-19 Rapid Screening  -     POCT Influenza A/B Molecular  -     POCT Strep A, Molecular    Other orders  -     predniSONE (DELTASONE) 20 MG tablet; Take 1 tablet (20 mg total) by mouth 2 (two) times daily. for 5 days  Dispense: 10 tablet; Refill: 0      Drink plenty of fluids.     Get plenty of rest.     Tylenol or Motrin as needed.     Go to the ER with any significant change or worsening of symptoms.     Follow up with your primary care doctor.

## 2023-12-04 ENCOUNTER — OFFICE VISIT (OUTPATIENT)
Dept: URGENT CARE | Facility: CLINIC | Age: 38
End: 2023-12-04
Payer: COMMERCIAL

## 2023-12-04 VITALS
WEIGHT: 250 LBS | HEART RATE: 77 BPM | TEMPERATURE: 98 F | HEIGHT: 60 IN | RESPIRATION RATE: 18 BRPM | SYSTOLIC BLOOD PRESSURE: 103 MMHG | OXYGEN SATURATION: 99 % | BODY MASS INDEX: 49.08 KG/M2 | DIASTOLIC BLOOD PRESSURE: 73 MMHG

## 2023-12-04 DIAGNOSIS — J02.0 STREP PHARYNGITIS: Primary | ICD-10-CM

## 2023-12-04 DIAGNOSIS — R52 BODY ACHES: ICD-10-CM

## 2023-12-04 LAB
CTP QC/QA: YES
MOLECULAR STREP A: POSITIVE
POC MOLECULAR INFLUENZA A AGN: NEGATIVE
POC MOLECULAR INFLUENZA B AGN: NEGATIVE
SARS-COV-2 RDRP RESP QL NAA+PROBE: NEGATIVE

## 2023-12-04 PROCEDURE — 87635 SARS-COV-2 COVID-19 AMP PRB: CPT | Mod: QW,,,

## 2023-12-04 PROCEDURE — 99214 OFFICE O/P EST MOD 30 MIN: CPT | Mod: ,,,

## 2023-12-04 PROCEDURE — 99214 PR OFFICE/OUTPT VISIT, EST, LEVL IV, 30-39 MIN: ICD-10-PCS | Mod: ,,,

## 2023-12-04 PROCEDURE — 87502 POCT INFLUENZA A/B MOLECULAR: ICD-10-PCS | Mod: QW,,,

## 2023-12-04 PROCEDURE — 87502 INFLUENZA DNA AMP PROBE: CPT | Mod: QW,,,

## 2023-12-04 PROCEDURE — 87651 STREP A DNA AMP PROBE: CPT | Mod: QW,,,

## 2023-12-04 PROCEDURE — 87635: ICD-10-PCS | Mod: QW,,,

## 2023-12-04 PROCEDURE — 87651 POCT STREP A MOLECULAR: ICD-10-PCS | Mod: QW,,,

## 2023-12-04 RX ORDER — CEFDINIR 300 MG/1
300 CAPSULE ORAL 2 TIMES DAILY
Qty: 20 CAPSULE | Refills: 0 | Status: SHIPPED | OUTPATIENT
Start: 2023-12-04 | End: 2023-12-14

## 2023-12-04 NOTE — PATIENT INSTRUCTIONS
EXCUSE FOR TODAY AND TOMORROW  Negative flu, COVID  Complete full course of antibiotics to prevent rare complication of inadequate strep treatment. Take antibiotics with food.     Strep swab + for throat infection.   Condition and course discussed.   Drink plenty of fluids and get plenty of rest.  Noninfectious after 1-2 days on medicine and no fever (temp less than 100.4 F )  Change tooth brush after 6-7 days on medicine  Claritin, Zyrtec or other OTC antihistamine for nasal congestion.   Warm saltwater gargles for sore throat.  Warm water with honey to help coat the throat.  Throat lozenges.  Chloraseptic Spray for worsening sore throat  Tylenol and ibuprofen as needed for sore throat and fever.       Call or return to clinic as needed.  Go to the ER with any significant change or worsening of symptoms.   Follow up with your primary care doctor.  If you do not have a primary care provider and need a referral, one may be placed for you by the clinic.  If you smoke, vape, or use tobacco, you should quit.  Smoking cessation information can be provided to you.

## 2023-12-04 NOTE — PROGRESS NOTES
Subjective:      Patient ID: Jeanna Escobedo is a 38 y.o. female.    Vitals:  height is 5' (1.524 m) and weight is 113.4 kg (250 lb). Her oral temperature is 98 °F (36.7 °C). Her blood pressure is 103/73 and her pulse is 77. Her respiration is 18 and oxygen saturation is 99%.     Chief Complaint: Sore Throat (37 y/o female presents to clinic with c/o sore throat, fever , chills, body aches , coughing and mild headache started yesterday. Tried motrin.)    Patient is a 38-year-old female who presents to urgent care clinic with complaints of sore throat, chills, body aches, fever T-max 101° began yesterday.  She also reports cough, headache.  Alleviating factors include Motrin over-the-counter.  Denies neck stiffness, rash, GI symptoms, difficulty swallowing.    Sore Throat         Constitution: Positive for fever.   HENT:  Positive for sore throat.       Objective:     Physical Exam   Constitutional: She is oriented to person, place, and time. She appears well-developed. She is cooperative.  Non-toxic appearance. She does not appear ill. No distress.   HENT:   Head: Normocephalic and atraumatic.   Ears:   Right Ear: Hearing, tympanic membrane, external ear and ear canal normal.   Left Ear: Hearing, tympanic membrane, external ear and ear canal normal.   Nose: Nose normal. No mucosal edema, rhinorrhea or nasal deformity. No epistaxis. Right sinus exhibits no maxillary sinus tenderness and no frontal sinus tenderness. Left sinus exhibits no maxillary sinus tenderness and no frontal sinus tenderness.   Mouth/Throat: Uvula is midline and mucous membranes are normal. Mucous membranes are moist. No trismus in the jaw. Normal dentition. No uvula swelling. Posterior oropharyngeal erythema present. No oropharyngeal exudate or posterior oropharyngeal edema.   Eyes: Conjunctivae and lids are normal. No scleral icterus.   Neck: Trachea normal and phonation normal. Neck supple. No edema present. No erythema present. No neck  rigidity present.   Cardiovascular: Normal rate, regular rhythm, normal heart sounds and normal pulses.   Pulmonary/Chest: Effort normal and breath sounds normal. No respiratory distress. She has no decreased breath sounds. She has no rhonchi.   Abdominal: Normal appearance.   Musculoskeletal: Normal range of motion.         General: No deformity. Normal range of motion.   Lymphadenopathy:     She has no cervical adenopathy.   Neurological: She is alert and oriented to person, place, and time. She exhibits normal muscle tone. Coordination normal.   Skin: Skin is warm, dry, intact, not diaphoretic and not pale.   Psychiatric: Her speech is normal and behavior is normal. Judgment and thought content normal.   Nursing note and vitals reviewed.      Assessment:     1. Strep pharyngitis    2. Body aches        Plan:       Strep pharyngitis  -     POCT Strep A, Molecular  -     cefdinir (OMNICEF) 300 MG capsule; Take 1 capsule (300 mg total) by mouth 2 (two) times daily. for 10 days  Dispense: 20 capsule; Refill: 0    Body aches  -     POCT Influenza A/B Molecular  -     POCT COVID-19 Rapid Screening      Patient is allergic to penicillins however she reports this is a rash, believes she is had cephalosporins in the past with no issues.    Complete full course of antibiotics to prevent rare complication of inadequate strep treatment. Take antibiotics with food.     Strep swab + for throat infection.   Condition and course discussed.   Drink plenty of fluids and get plenty of rest.  Noninfectious after 1-2 days on medicine and no fever (temp less than 100.4 F )  Change tooth brush after 6-7 days on medicine  Claritin, Zyrtec or other OTC antihistamine for nasal congestion.   Warm saltwater gargles for sore throat.  Warm water with honey to help coat the throat.  Throat lozenges.  Chloraseptic Spray for worsening sore throat  Tylenol and ibuprofen as needed for sore throat and fever.       Call or return to clinic as  needed.  Go to the ER with any significant change or worsening of symptoms.   Follow up with your primary care doctor.  If you do not have a primary care provider and need a referral, one may be placed for you by the clinic.  If you smoke, vape, or use tobacco, you should quit.  Smoking cessation information can be provided to you.

## 2024-02-11 ENCOUNTER — OFFICE VISIT (OUTPATIENT)
Dept: URGENT CARE | Facility: CLINIC | Age: 39
End: 2024-02-11
Payer: COMMERCIAL

## 2024-02-11 VITALS
TEMPERATURE: 98 F | OXYGEN SATURATION: 97 % | HEIGHT: 60 IN | HEART RATE: 95 BPM | WEIGHT: 250 LBS | DIASTOLIC BLOOD PRESSURE: 84 MMHG | BODY MASS INDEX: 49.08 KG/M2 | SYSTOLIC BLOOD PRESSURE: 116 MMHG | RESPIRATION RATE: 18 BRPM

## 2024-02-11 DIAGNOSIS — U07.1 COVID-19: Primary | ICD-10-CM

## 2024-02-11 DIAGNOSIS — R09.81 NASAL CONGESTION: ICD-10-CM

## 2024-02-11 DIAGNOSIS — R52 BODY ACHES: ICD-10-CM

## 2024-02-11 LAB
CTP QC/QA: YES
CTP QC/QA: YES
POC MOLECULAR INFLUENZA A AGN: NEGATIVE
POC MOLECULAR INFLUENZA B AGN: NEGATIVE
SARS-COV-2 RDRP RESP QL NAA+PROBE: POSITIVE

## 2024-02-11 PROCEDURE — 99213 OFFICE O/P EST LOW 20 MIN: CPT | Mod: ,,,

## 2024-02-11 PROCEDURE — 87502 INFLUENZA DNA AMP PROBE: CPT | Mod: QW,,,

## 2024-02-11 PROCEDURE — 87635 SARS-COV-2 COVID-19 AMP PRB: CPT | Mod: QW,,,

## 2024-02-11 RX ORDER — METHYLPREDNISOLONE 4 MG/1
TABLET ORAL
Qty: 21 EACH | Refills: 0 | Status: SHIPPED | OUTPATIENT
Start: 2024-02-11 | End: 2024-03-03

## 2024-02-11 NOTE — PROGRESS NOTES
Subjective:      Patient ID: Jeanna Escobedo is a 38 y.o. female.    Vitals:  height is 5' (1.524 m) and weight is 113.4 kg (250 lb). Her temperature is 97.9 °F (36.6 °C). Her blood pressure is 116/84 and her pulse is 95. Her respiration is 18 and oxygen saturation is 97%.     Chief Complaint: Fever (Fever(100.3 x last night), BA, NC x2d Aleve mild/Denies nausea, vomiting, ST)    Is a 38-year-old female that presents complaining of fever, body aches, congestion for the past 2 days.  She has checked her son in with the same symptoms. Patient denies any SOB, CP, rash, n/v/d, or neck stiffness.      Fever   Associated symptoms include congestion.       Constitution: Positive for fever.   HENT:  Positive for congestion.       Objective:     Physical Exam   Constitutional: She is oriented to person, place, and time.  Non-toxic appearance. She does not appear ill.   HENT:   Ears:   Right Ear: Tympanic membrane, external ear and ear canal normal.   Left Ear: Tympanic membrane, external ear and ear canal normal.   Nose: Congestion present.   Mouth/Throat: Mucous membranes are moist. No posterior oropharyngeal erythema. Oropharynx is clear.   Eyes: Conjunctivae are normal.   Cardiovascular: Normal rate and normal pulses.   Pulmonary/Chest: Effort normal and breath sounds normal.   Abdominal: Normal appearance and bowel sounds are normal. Soft. There is no abdominal tenderness.   Musculoskeletal: Normal range of motion.         General: Normal range of motion.   Neurological: She is alert and oriented to person, place, and time.   Skin: Skin is warm, dry and no rash. Capillary refill takes less than 2 seconds.   Psychiatric: Her behavior is normal. Mood normal.   Nursing note and vitals reviewed.      Assessment:     1. COVID-19    2. Body aches    3. Nasal congestion           Previous History      Review of patient's allergies indicates:   Allergen Reactions    Omeprazole Itching and Swelling    Codeine Itching     Nexium [esomeprazole magnesium] Hives    Nitrofurantoin Hives and Itching    Nitrofurantoin macrocrystal     Penicillins Itching    Tramadol Itching       Past Medical History:   Diagnosis Date    Disorder of thyroid, unspecified     GERD (gastroesophageal reflux disease)      Current Outpatient Medications   Medication Instructions    ARMOUR THYROID 90 mg, Oral, Daily    ascorbic acid (vitamin C) (VITAMIN C) 500 mg, Oral, Daily    methylPREDNISolone (MEDROL DOSEPACK) 4 mg tablet use as directed    pantoprazole (PROTONIX) 40 mg, Oral, Every morning    vitamin D (VITAMIN D3) 1,000 Units, Oral, Daily    zinc sulfate (ZINC-220 ORAL) Oral     Past Surgical History:   Procedure Laterality Date    ADENOIDECTOMY      THYROIDECTOMY, PARTIAL      TONSILLECTOMY      ULNAR NERVE REPAIR Right     ULNAR NERVE REPAIR Right      Family History   Problem Relation Age of Onset    No Known Problems Mother     No Known Problems Father        Social History     Tobacco Use    Smoking status: Never    Smokeless tobacco: Never   Substance Use Topics    Alcohol use: Never    Drug use: Never        Physical Exam      Vital Signs Reviewed   /84   Pulse 95   Temp 97.9 °F (36.6 °C)   Resp 18   Ht 5' (1.524 m)   Wt 113.4 kg (250 lb)   SpO2 97%   BMI 48.82 kg/m²        Procedures    Procedures     Labs     Results for orders placed or performed in visit on 02/11/24   POCT COVID-19 Rapid Screening   Result Value Ref Range    POC Rapid COVID Positive (A) Negative     Acceptable Yes       Plan:       COVID-19    Body aches  -     POCT COVID-19 Rapid Screening  -     POCT Influenza A/B Molecular    Nasal congestion  -     methylPREDNISolone (MEDROL DOSEPACK) 4 mg tablet; use as directed  Dispense: 21 each; Refill: 0      COVID-Take Tylenol (acetaminophen) every 4 hours and/or Motrin every 6 for fever/aches.  Drink plenty of fluids.     Take OTC Decongestants  (Claritin D/sudafed OR Coricidin for people with HTN) to cut  down on the fluid in the lining of your nose and relieve swollen nasal passages and congestion. May also use cough/cold/congestion medication such as Dayquil/Nyquil and/or antihistamine such as Claritin/Zyrtec/Allegra.  Cepacol sore throat lozenges/spray if needed.     Drink plenty of fluids.  Rest.     Go directly to ER if you experience any SOB, chest pain, or other worrisome symptoms.      If positive for Covid-19, you should stay in isolation until: 1) At least 5 days have passed since your symptoms first appeared and  2) At least 24 hours have passed since recovery defined as resolution of fever without the use of fever-reducing medications and improvement in symptoms

## 2024-02-11 NOTE — PATIENT INSTRUCTIONS
COVID-Take Tylenol (acetaminophen) every 4 hours and/or Motrin every 6 for fever/aches.  Drink plenty of fluids.     Take OTC Decongestants  (Claritin D/sudafed OR Coricidin for people with HTN) to cut down on the fluid in the lining of your nose and relieve swollen nasal passages and congestion. May also use cough/cold/congestion medication such as Dayquil/Nyquil and/or antihistamine such as Claritin/Zyrtec/Allegra.  Cepacol sore throat lozenges/spray if needed.     Drink plenty of fluids.  Rest.     Go directly to ER if you experience any SOB, chest pain, or other worrisome symptoms.      If positive for Covid-19, you should stay in isolation until: 1) At least 5 days have passed since your symptoms first appeared and  2) At least 24 hours have passed since recovery defined as resolution of fever without the use of fever-reducing medications and improvement in symptoms

## 2024-02-14 ENCOUNTER — PATIENT MESSAGE (OUTPATIENT)
Dept: RESEARCH | Facility: HOSPITAL | Age: 39
End: 2024-02-14
Payer: COMMERCIAL

## 2024-04-28 ENCOUNTER — OFFICE VISIT (OUTPATIENT)
Dept: URGENT CARE | Facility: CLINIC | Age: 39
End: 2024-04-28
Payer: COMMERCIAL

## 2024-04-28 VITALS
SYSTOLIC BLOOD PRESSURE: 115 MMHG | RESPIRATION RATE: 20 BRPM | HEART RATE: 67 BPM | WEIGHT: 250 LBS | BODY MASS INDEX: 49.08 KG/M2 | OXYGEN SATURATION: 98 % | TEMPERATURE: 97 F | DIASTOLIC BLOOD PRESSURE: 81 MMHG | HEIGHT: 60 IN

## 2024-04-28 DIAGNOSIS — R50.9 FEVER, UNSPECIFIED FEVER CAUSE: ICD-10-CM

## 2024-04-28 DIAGNOSIS — J01.90 ACUTE NON-RECURRENT SINUSITIS, UNSPECIFIED LOCATION: Primary | ICD-10-CM

## 2024-04-28 PROCEDURE — 99213 OFFICE O/P EST LOW 20 MIN: CPT | Mod: ,,, | Performed by: PHYSICIAN ASSISTANT

## 2024-04-28 RX ORDER — PREDNISONE 20 MG/1
20 TABLET ORAL 2 TIMES DAILY
Qty: 10 TABLET | Refills: 0 | Status: SHIPPED | OUTPATIENT
Start: 2024-04-28 | End: 2024-05-03

## 2024-04-28 RX ORDER — HYDROCHLOROTHIAZIDE 12.5 MG/1
12.5 TABLET ORAL EVERY MORNING
COMMUNITY

## 2024-04-28 RX ORDER — DOXYCYCLINE 100 MG/1
100 CAPSULE ORAL EVERY 12 HOURS
Qty: 20 CAPSULE | Refills: 0 | Status: SHIPPED | OUTPATIENT
Start: 2024-04-28 | End: 2024-05-08

## 2024-04-28 RX ORDER — FLUTICASONE PROPIONATE 50 MCG
1 SPRAY, SUSPENSION (ML) NASAL 2 TIMES DAILY
COMMUNITY
Start: 2024-01-29

## 2024-04-28 NOTE — PROGRESS NOTES
Subjective:      Patient ID: Jeanna Escobedo is a 39 y.o. female.    Vitals:  height is 5' (1.524 m) and weight is 113.4 kg (250 lb). Her tympanic temperature is 97.2 °F (36.2 °C). Her blood pressure is 115/81 and her pulse is 67. Her respiration is 20 and oxygen saturation is 98%.     Chief Complaint: Nasal Congestion     Patient is a 39 y.o. female who presents to urgent care with complaints of cough, sore throat, fever, SOB x1 weeks. Alleviating factors include robitussin with no relief.  States that the fever did not start until yesterday.  She does report some nighttime wheezing.      ROS   Objective:     Physical Exam   Constitutional: She is oriented to person, place, and time. She appears well-developed. She is cooperative.  Non-toxic appearance. She does not appear ill. No distress.   HENT:   Head: Normocephalic and atraumatic.   Ears:   Right Ear: Hearing, tympanic membrane, external ear and ear canal normal.   Left Ear: Hearing, tympanic membrane, external ear and ear canal normal.   Nose: Nose normal. No nasal deformity. No epistaxis.   Mouth/Throat: Uvula is midline, oropharynx is clear and moist and mucous membranes are normal. No trismus in the jaw. Normal dentition. No uvula swelling. No oropharyngeal exudate, posterior oropharyngeal edema or posterior oropharyngeal erythema.   Eyes: Conjunctivae and lids are normal. No scleral icterus.   Neck: Trachea normal and phonation normal. Neck supple. No edema present. No erythema present. No neck rigidity present.   Cardiovascular: Normal rate, regular rhythm, normal heart sounds and normal pulses.   Pulmonary/Chest: Effort normal and breath sounds normal. No respiratory distress. She has no decreased breath sounds. She has no rhonchi.   Abdominal: Normal appearance.   Musculoskeletal: Normal range of motion.         General: No deformity. Normal range of motion.   Neurological: She is alert and oriented to person, place, and time. She exhibits normal  muscle tone. Coordination normal.   Skin: Skin is warm, dry, intact, not diaphoretic and not pale.   Psychiatric: Her speech is normal and behavior is normal. Judgment and thought content normal.   Nursing note and vitals reviewed.         Previous History      Review of patient's allergies indicates:   Allergen Reactions    Omeprazole Itching and Swelling    Codeine Itching    Nexium [esomeprazole magnesium] Hives    Nitrofurantoin Hives and Itching    Nitrofurantoin macrocrystal     Penicillins Itching    Tramadol Itching       Past Medical History:   Diagnosis Date    Disorder of thyroid, unspecified     GERD (gastroesophageal reflux disease)      Current Outpatient Medications   Medication Instructions    ARMOUR THYROID 90 mg, Oral, Daily    ascorbic acid (vitamin C) (VITAMIN C) 500 mg, Daily    doxycycline (MONODOX) 100 mg, Oral, Every 12 hours    fluticasone propionate (FLONASE) 50 mcg/actuation nasal spray 1 spray, Each Nostril, 2 times daily    hydroCHLOROthiazide (HYDRODIURIL) 12.5 mg, Oral, Every morning    pantoprazole (PROTONIX) 40 mg, Oral, Every morning    predniSONE (DELTASONE) 20 mg, Oral, 2 times daily    vitamin D (VITAMIN D3) 1,000 Units, Daily    zinc sulfate (ZINC-220 ORAL) Take by mouth.     Past Surgical History:   Procedure Laterality Date    ADENOIDECTOMY      THYROIDECTOMY, PARTIAL      TONSILLECTOMY      ULNAR NERVE REPAIR Right     ULNAR NERVE REPAIR Right      Family History   Problem Relation Name Age of Onset    No Known Problems Mother      No Known Problems Father         Social History     Tobacco Use    Smoking status: Never    Smokeless tobacco: Never   Substance Use Topics    Alcohol use: Never    Drug use: Never        Physical Exam      Vital Signs Reviewed   /81   Pulse 67   Temp 97.2 °F (36.2 °C) (Tympanic)   Resp 20   Ht 5' (1.524 m)   Wt 113.4 kg (250 lb)   SpO2 98%   BMI 48.82 kg/m²        Procedures    Procedures     Labs     Results for orders placed or  performed in visit on 02/11/24   POCT COVID-19 Rapid Screening   Result Value Ref Range    POC Rapid COVID Positive (A) Negative     Acceptable Yes    POCT Influenza A/B Molecular   Result Value Ref Range    POC Molecular Influenza A Ag Negative Negative, Not Reported    POC Molecular Influenza B Ag Negative Negative, Not Reported     Acceptable Yes      Assessment:     1. Acute non-recurrent sinusitis, unspecified location    2. Fever, unspecified fever cause        Plan:       Acute non-recurrent sinusitis, unspecified location    Fever, unspecified fever cause    Other orders  -     doxycycline (MONODOX) 100 MG capsule; Take 1 capsule (100 mg total) by mouth every 12 (twelve) hours. for 10 days  Dispense: 20 capsule; Refill: 0  -     predniSONE (DELTASONE) 20 MG tablet; Take 1 tablet (20 mg total) by mouth 2 (two) times daily. for 5 days  Dispense: 10 tablet; Refill: 0    Drink plenty of fluids.     Get plenty of rest.     Tylenol or Motrin as needed.     Go to the ER with any significant change or worsening of symptoms.     Follow up with your primary care doctor.

## 2024-11-16 ENCOUNTER — OFFICE VISIT (OUTPATIENT)
Dept: URGENT CARE | Facility: CLINIC | Age: 39
End: 2024-11-16
Payer: COMMERCIAL

## 2024-11-16 VITALS
HEART RATE: 93 BPM | SYSTOLIC BLOOD PRESSURE: 127 MMHG | WEIGHT: 250 LBS | HEIGHT: 60 IN | TEMPERATURE: 99 F | RESPIRATION RATE: 17 BRPM | BODY MASS INDEX: 49.08 KG/M2 | OXYGEN SATURATION: 98 % | DIASTOLIC BLOOD PRESSURE: 88 MMHG

## 2024-11-16 DIAGNOSIS — J02.9 SORE THROAT: Primary | ICD-10-CM

## 2024-11-16 DIAGNOSIS — R05.1 ACUTE COUGH: ICD-10-CM

## 2024-11-16 DIAGNOSIS — J04.0 LARYNGITIS: ICD-10-CM

## 2024-11-16 LAB
CTP QC/QA: YES
MOLECULAR STREP A: NEGATIVE
POC MOLECULAR INFLUENZA A AGN: NEGATIVE
POC MOLECULAR INFLUENZA B AGN: NEGATIVE
SARS-COV-2 AG RESP QL IA.RAPID: NEGATIVE

## 2024-11-16 PROCEDURE — 87502 INFLUENZA DNA AMP PROBE: CPT | Mod: QW,,, | Performed by: PHYSICIAN ASSISTANT

## 2024-11-16 PROCEDURE — 96372 THER/PROPH/DIAG INJ SC/IM: CPT | Mod: ,,, | Performed by: PHYSICIAN ASSISTANT

## 2024-11-16 PROCEDURE — 87811 SARS-COV-2 COVID19 W/OPTIC: CPT | Mod: QW,,, | Performed by: PHYSICIAN ASSISTANT

## 2024-11-16 PROCEDURE — 99214 OFFICE O/P EST MOD 30 MIN: CPT | Mod: 25,,, | Performed by: PHYSICIAN ASSISTANT

## 2024-11-16 PROCEDURE — 87651 STREP A DNA AMP PROBE: CPT | Mod: QW,,, | Performed by: PHYSICIAN ASSISTANT

## 2024-11-16 RX ORDER — PROMETHAZINE HYDROCHLORIDE AND DEXTROMETHORPHAN HYDROBROMIDE 6.25; 15 MG/5ML; MG/5ML
5 SYRUP ORAL EVERY 8 HOURS PRN
Qty: 118 ML | Refills: 0 | Status: SHIPPED | OUTPATIENT
Start: 2024-11-16 | End: 2024-11-21

## 2024-11-16 RX ORDER — BETAMETHASONE SODIUM PHOSPHATE AND BETAMETHASONE ACETATE 3; 3 MG/ML; MG/ML
9 INJECTION, SUSPENSION INTRA-ARTICULAR; INTRALESIONAL; INTRAMUSCULAR; SOFT TISSUE
Status: COMPLETED | OUTPATIENT
Start: 2024-11-16 | End: 2024-11-16

## 2024-11-16 RX ADMIN — BETAMETHASONE SODIUM PHOSPHATE AND BETAMETHASONE ACETATE 9 MG: 3; 3 INJECTION, SUSPENSION INTRA-ARTICULAR; INTRALESIONAL; INTRAMUSCULAR; SOFT TISSUE at 01:11

## 2024-11-16 NOTE — LETTER
November 16, 2024      [facility]  [address] [address]  Phone: [phone]       Patient: Jeanna Escobedo   YOB: 1985  Date of Visit: 11/16/2024    To Whom It May Concern:    Chris Escobedo  was at [facility] on 11/16/2024. Please excuse the patient for the date of the 11/18/2024. She may return to work/school on 11/19/2024 with no restrictions. If you have any questions or concerns, or if I can be of further assistance, please do not hesitate to contact me.    Sincerely,    Hernán Norton, RT

## 2024-11-16 NOTE — PROGRESS NOTES
Subjective:      Patient ID: Jeanna Escobedo is a 39 y.o. female.    Vitals:  height is 5' (1.524 m) and weight is 113.4 kg (250 lb). Her temperature is 98.7 °F (37.1 °C). Her blood pressure is 127/88 and her pulse is 93. Her respiration is 17 and oxygen saturation is 98%.     Chief Complaint: Sore Throat    Female reports in the last 4 days having cough congestion postnasal drip sore throat with nausea and fatigue with new laryngitis today presents to urgent care today for initial evaluation.  Patient reports indirect contact to co-worker whose son tested positive for influenza B last week.    Sore Throat   This is a new problem. Associated symptoms include congestion and coughing. Pertinent negatives include no ear pain, headaches, neck pain, shortness of breath, stridor, trouble swallowing or vomiting.     Constitution: Positive for fatigue. Negative for fever.   HENT:  Positive for congestion, sinus pressure, sore throat and voice change. Negative for ear pain, sinus pain and trouble swallowing.    Neck: Negative for neck pain and neck swelling.   Respiratory:  Positive for cough. Negative for shortness of breath, stridor and wheezing.    Gastrointestinal:  Positive for nausea. Negative for vomiting.   Musculoskeletal:  Negative for muscle ache.   Skin: Negative.    Allergic/Immunologic: Negative.    Neurological:  Negative for headaches.   Psychiatric/Behavioral:  Positive for sleep disturbance.       Objective:     Physical Exam   Constitutional: She is oriented to person, place, and time. She appears well-developed. She is cooperative.  Non-toxic appearance. She appears ill.      Comments:Ambulatory nasally congested fatigue female speaks in complete sentences     HENT:   Head: Normocephalic.   Ears:   Right Ear: Hearing, tympanic membrane, external ear and ear canal normal. Tympanic membrane is not perforated, not erythematous and not bulging. No middle ear effusion.   Left Ear: Hearing, tympanic  membrane, external ear and ear canal normal. Tympanic membrane is not perforated, not erythematous and not bulging.  No middle ear effusion.   Nose: Mucosal edema and congestion present. No rhinorrhea, purulent discharge or nasal deformity. No epistaxis. Right sinus exhibits no maxillary sinus tenderness and no frontal sinus tenderness. Left sinus exhibits no maxillary sinus tenderness and no frontal sinus tenderness.   Mouth/Throat: Uvula is midline, oropharynx is clear and moist and mucous membranes are normal. Mucous membranes are moist. No trismus in the jaw. Normal dentition. No uvula swelling. No oropharyngeal exudate, posterior oropharyngeal edema or posterior oropharyngeal erythema.      Comments: No edema, no palate petechiae  Eyes: Conjunctivae and lids are normal.   Neck: Trachea normal and phonation normal. Neck supple. No edema present. No erythema present. No neck rigidity present.   Cardiovascular: Normal rate, regular rhythm, normal heart sounds and normal pulses.   No murmur heard.Exam reveals no gallop.   Pulmonary/Chest: Effort normal and breath sounds normal. No stridor. No respiratory distress. She has no decreased breath sounds. She has no wheezes. She has no rhonchi. She has no rales.   Musculoskeletal: Normal range of motion.         General: No swelling. Normal range of motion.      Cervical back: She exhibits no tenderness.   Lymphadenopathy:     She has no cervical adenopathy.   Neurological: no focal deficit. She is alert and oriented to person, place, and time. She exhibits normal muscle tone.   Skin: Skin is warm, dry, intact, not diaphoretic, not pale and no rash.   Psychiatric: Her speech is normal and behavior is normal. Judgment and thought content normal.   Nursing note and vitals reviewed.       Previous History      Review of patient's allergies indicates:   Allergen Reactions    Omeprazole Itching and Swelling    Codeine Itching    Nexium [esomeprazole magnesium] Hives     Nitrofurantoin Hives and Itching    Nitrofurantoin macrocrystal     Penicillins Itching    Tramadol Itching       Past Medical History:   Diagnosis Date    Disorder of thyroid, unspecified     GERD (gastroesophageal reflux disease)      Current Outpatient Medications   Medication Instructions    ARMOUR THYROID 90 mg, Oral, Daily    ascorbic acid (vitamin C) (VITAMIN C) 500 mg, Daily    fluticasone propionate (FLONASE) 50 mcg/actuation nasal spray 1 spray, 2 times daily    hydroCHLOROthiazide (HYDRODIURIL) 12.5 mg, Every morning    pantoprazole (PROTONIX) 40 mg, Every morning    promethazine-dextromethorphan (PROMETHAZINE-DM) 6.25-15 mg/5 mL Syrp 5 mLs, Oral, Every 8 hours PRN    vitamin D (VITAMIN D3) 1,000 Units, Daily    zinc sulfate (ZINC-220 ORAL) Take by mouth.     Past Surgical History:   Procedure Laterality Date    ADENOIDECTOMY      THYROIDECTOMY, PARTIAL      TONSILLECTOMY      ULNAR NERVE REPAIR Right     ULNAR NERVE REPAIR Right      Family History   Problem Relation Name Age of Onset    No Known Problems Mother      No Known Problems Father         Social History     Tobacco Use    Smoking status: Never     Passive exposure: Never    Smokeless tobacco: Never   Substance Use Topics    Alcohol use: Never    Drug use: Never        Physical Exam      Vital Signs Reviewed   /88   Pulse 93   Temp 98.7 °F (37.1 °C)   Resp 17   Ht 5' (1.524 m)   Wt 113.4 kg (250 lb)   SpO2 98%   BMI 48.82 kg/m²        Procedures    Procedures     Labs     Results for orders placed or performed in visit on 02/11/24   POCT COVID-19 Rapid Screening    Collection Time: 02/11/24  1:14 PM   Result Value Ref Range    POC Rapid COVID Positive (A) Negative     Acceptable Yes    POCT Influenza A/B Molecular    Collection Time: 02/11/24  1:18 PM   Result Value Ref Range    POC Molecular Influenza A Ag Negative Negative, Not Reported    POC Molecular Influenza B Ag Negative Negative, Not Reported    Quality  Control Acceptable Yes          Assessment:     1. Sore throat    2. Acute cough    3. Laryngitis        Plan:     High concern for viral upper respiratory illness today.  Negative COVID, negative strep, negative influenza testing today.       Recommend alternate Tylenol and ibuprofen every 4-6 hours if needed for aches pains fever chills.  Recommend daily non sedating antihistamine Claritin Zyrtec or loratadine over the next 1-2 weeks for mild-to-moderate nasal allergies with Benadryl nightly if needed for severe nasal allergies.  Recommend Sudafed or Coricidin decongestant over the next week if needed for nasal congestion and sinus pressure with 3 day limited Afrin or Leroy-Synephrine nasal spray.  Phenergan DM sparingly lowest dose if needed for severe cough cold congestion body aches and rest.  Do not take sedating medication drive or operate machinery.  Recommend saltwater gargles throat lozenge Chloraseptic spray and voice rest over the next week for laryngitis.    Recommend follow-up with primary care physician in 1-2 weeks for re-evaluation if not improving.  Sore throat  -     POCT Influenza A/B Molecular  -     POCT Strep A, Molecular  -     SARS Coronavirus 2 Antigen, POCT Manual Read    Acute cough    Laryngitis    Other orders  -     betamethasone acetate-betamethasone sodium phosphate injection 9 mg  -     promethazine-dextromethorphan (PROMETHAZINE-DM) 6.25-15 mg/5 mL Syrp; Take 5 mLs by mouth every 8 (eight) hours as needed (cough).  Dispense: 118 mL; Refill: 0

## 2024-11-16 NOTE — PATIENT INSTRUCTIONS
High concern for viral upper respiratory illness today.  Negative COVID, negative strep, negative influenza testing today.       Recommend alternate Tylenol and ibuprofen every 4-6 hours if needed for aches pains fever chills.  Recommend daily non sedating antihistamine Claritin Zyrtec or loratadine over the next 1-2 weeks for mild-to-moderate nasal allergies with Benadryl nightly if needed for severe nasal allergies.  Recommend Sudafed or Coricidin decongestant over the next week if needed for nasal congestion and sinus pressure with 3 day limited Afrin or Leroy-Synephrine nasal spray.  Phenergan DM sparingly lowest dose if needed for severe cough cold congestion body aches and rest.  Do not take sedating medication drive or operate machinery.  Recommend saltwater gargles throat lozenge Chloraseptic spray and voice rest over the next week for laryngitis.    Recommend follow-up with primary care physician in 1-2 weeks for re-evaluation if not improving.

## 2024-11-21 ENCOUNTER — OFFICE VISIT (OUTPATIENT)
Dept: URGENT CARE | Facility: CLINIC | Age: 39
End: 2024-11-21
Payer: COMMERCIAL

## 2024-11-21 VITALS
HEIGHT: 60 IN | RESPIRATION RATE: 18 BRPM | OXYGEN SATURATION: 98 % | DIASTOLIC BLOOD PRESSURE: 87 MMHG | HEART RATE: 65 BPM | TEMPERATURE: 98 F | SYSTOLIC BLOOD PRESSURE: 138 MMHG | BODY MASS INDEX: 49.08 KG/M2 | WEIGHT: 250 LBS

## 2024-11-21 DIAGNOSIS — J01.00 ACUTE MAXILLARY SINUSITIS, RECURRENCE NOT SPECIFIED: Primary | ICD-10-CM

## 2024-11-21 PROCEDURE — 99213 OFFICE O/P EST LOW 20 MIN: CPT | Mod: ,,, | Performed by: PHYSICIAN ASSISTANT

## 2024-11-21 RX ORDER — DOXYCYCLINE 100 MG/1
100 CAPSULE ORAL 2 TIMES DAILY
Qty: 20 CAPSULE | Refills: 0 | Status: SHIPPED | OUTPATIENT
Start: 2024-11-21 | End: 2024-12-01

## 2024-11-21 RX ORDER — PROMETHAZINE HYDROCHLORIDE AND DEXTROMETHORPHAN HYDROBROMIDE 6.25; 15 MG/5ML; MG/5ML
5 SYRUP ORAL EVERY 8 HOURS PRN
Qty: 118 ML | Refills: 0 | Status: SHIPPED | OUTPATIENT
Start: 2024-11-21 | End: 2024-11-26

## 2024-11-21 RX ORDER — PREDNISONE 10 MG/1
10 TABLET ORAL DAILY
Qty: 5 TABLET | Refills: 0 | Status: SHIPPED | OUTPATIENT
Start: 2024-11-21 | End: 2024-11-26

## 2024-11-21 NOTE — PATIENT INSTRUCTIONS
Recommend doxycycline antibiotic coverage for sinus infection.  Recommend Sudafed or Coricidin decongestant over the next 1-2 weeks if needed for nasal sinus congestion pressure and inflammation with 3 day limited Afrin or Leroy-Synephrine nasal spray.  May add oral steroid extension for sinus congestion pressure cough and inflammation.  Recommend daily non sedating antihistamine Claritin Zyrtec or loratadine over the next 1-2 weeks for nasal allergies with Benadryl nightly if needed for severe nasal allergies.    Recommend follow-up with primary care physician or ENT in the next 1-2 weeks for re-evaluation if not improving.

## 2024-11-21 NOTE — PROGRESS NOTES
Subjective:      Patient ID: Jeanna Escobedo is a 39 y.o. female.    Vitals:  height is 5' (1.524 m) and weight is 113.4 kg (250 lb). Her temperature is 97.7 °F (36.5 °C). Her blood pressure is 138/87 and her pulse is 65. Her respiration is 18 and oxygen saturation is 98%.     Chief Complaint: Sore Throat    Female reports over the last 9 days having persistent nasal congestion sinus pressure postnasal drip sore throat not resolve with last urgent care steroid injection and OTC Robitussin with honey presents to urgent Care for re-evaluation.    Sore Throat   The current episode started 1 to 4 weeks ago. There has been no fever. Associated symptoms include congestion and coughing. Pertinent negatives include no ear pain, neck pain, shortness of breath, stridor or trouble swallowing.     Constitution: Negative for chills and fever.   HENT:  Positive for congestion, postnasal drip, sinus pain, sinus pressure and sore throat. Negative for ear pain, trouble swallowing and voice change.    Neck: Negative for neck pain and neck swelling.   Cardiovascular:  Negative for chest pain.   Respiratory:  Positive for cough. Negative for shortness of breath, stridor and wheezing.    Gastrointestinal: Negative.    Musculoskeletal:  Negative for muscle ache.   Skin: Negative.    Allergic/Immunologic: Negative.    Neurological:  Negative for dizziness.      Objective:     Physical Exam   Constitutional: She is oriented to person, place, and time. She appears well-developed. She is cooperative.  Non-toxic appearance.      Comments:Awake alert ambulatory nasally congested female   obesity  HENT:   Head: Normocephalic.   Ears:   Right Ear: Hearing, tympanic membrane, external ear and ear canal normal. Tympanic membrane is not erythematous and not bulging. No middle ear effusion.   Left Ear: Hearing, tympanic membrane, external ear and ear canal normal. Tympanic membrane is not erythematous and not bulging.  No middle ear effusion.    Nose: Mucosal edema and congestion present. No rhinorrhea, purulent discharge or nasal deformity. No epistaxis. Right sinus exhibits maxillary sinus tenderness. Right sinus exhibits no frontal sinus tenderness. Left sinus exhibits maxillary sinus tenderness. Left sinus exhibits no frontal sinus tenderness.   Mouth/Throat: Uvula is midline, oropharynx is clear and moist and mucous membranes are normal. Mucous membranes are moist. No trismus in the jaw. Normal dentition. No uvula swelling. No oropharyngeal exudate, posterior oropharyngeal edema or posterior oropharyngeal erythema.      Comments: No edema, no palate petechiae, no muffled voice  Eyes: Conjunctivae and lids are normal.   Neck: Trachea normal and phonation normal. Neck supple. No edema present. No erythema present. No neck rigidity present.   Cardiovascular: Normal rate, regular rhythm, normal heart sounds and normal pulses.   No murmur heard.Exam reveals no gallop.   Pulmonary/Chest: Effort normal and breath sounds normal. No stridor. No respiratory distress. She has no decreased breath sounds. She has no wheezes. She has no rhonchi. She has no rales.         Comments: Clear to auscultation bilaterally all fields    Musculoskeletal: Normal range of motion.         General: Normal range of motion.      Cervical back: She exhibits no tenderness.   Lymphadenopathy:     She has no cervical adenopathy.   Neurological: no focal deficit. She is alert and oriented to person, place, and time. She exhibits normal muscle tone. Coordination normal.   Skin: Skin is warm, dry, intact, not diaphoretic, not pale and no rash.   Psychiatric: Her speech is normal and behavior is normal. Judgment and thought content normal.   Nursing note and vitals reviewed.         Previous History      Review of patient's allergies indicates:   Allergen Reactions    Omeprazole Itching and Swelling    Codeine Itching    Nexium [esomeprazole magnesium] Hives    Nitrofurantoin Hives and  Itching    Nitrofurantoin macrocrystal     Penicillins Itching    Tramadol Itching       Past Medical History:   Diagnosis Date    Disorder of thyroid, unspecified     GERD (gastroesophageal reflux disease)      Current Outpatient Medications   Medication Instructions    ARMOUR THYROID 90 mg, Daily    ascorbic acid (vitamin C) (VITAMIN C) 500 mg, Daily    doxycycline (VIBRAMYCIN) 100 mg, Oral, 2 times daily    fluticasone propionate (FLONASE) 50 mcg/actuation nasal spray 1 spray, 2 times daily    hydroCHLOROthiazide (HYDRODIURIL) 12.5 mg, Every morning    pantoprazole (PROTONIX) 40 mg, Every morning    predniSONE (DELTASONE) 10 mg, Oral, Daily    promethazine-dextromethorphan (PROMETHAZINE-DM) 6.25-15 mg/5 mL Syrp 5 mLs, Oral, Every 8 hours PRN    promethazine-dextromethorphan (PROMETHAZINE-DM) 6.25-15 mg/5 mL Syrp 5 mLs, Oral, Every 8 hours PRN    vitamin D (VITAMIN D3) 1,000 Units, Daily    zinc sulfate (ZINC-220 ORAL) Take by mouth.     Past Surgical History:   Procedure Laterality Date    ADENOIDECTOMY      THYROIDECTOMY, PARTIAL      TONSILLECTOMY      ULNAR NERVE REPAIR Right     ULNAR NERVE REPAIR Right      Family History   Problem Relation Name Age of Onset    No Known Problems Mother      No Known Problems Father         Social History     Tobacco Use    Smoking status: Never     Passive exposure: Never    Smokeless tobacco: Never   Substance Use Topics    Alcohol use: Never    Drug use: Never        Physical Exam      Vital Signs Reviewed   /87   Pulse 65   Temp 97.7 °F (36.5 °C)   Resp 18   Ht 5' (1.524 m)   Wt 113.4 kg (250 lb)   SpO2 98%   BMI 48.82 kg/m²        Procedures    Procedures     Labs     Results for orders placed or performed in visit on 11/16/24   POCT Influenza A/B Molecular    Collection Time: 11/16/24  1:32 PM   Result Value Ref Range    POC Molecular Influenza A Ag Negative Negative    POC Molecular Influenza B Ag Negative Negative     Acceptable Yes    POCT  Strep A, Molecular    Collection Time: 11/16/24  1:32 PM   Result Value Ref Range    Molecular Strep A, POC Negative Negative     Acceptable Yes    SARS Coronavirus 2 Antigen, POCT Manual Read    Collection Time: 11/16/24  1:32 PM   Result Value Ref Range    SARS Coronavirus 2 Antigen Negative Negative     Acceptable Yes        Assessment:     1. Acute maxillary sinusitis, recurrence not specified        Plan:     Recommend doxycycline antibiotic coverage for sinus infection.  Recommend Sudafed or Coricidin decongestant over the next 1-2 weeks if needed for nasal sinus congestion pressure and inflammation with 3 day limited Afrin or Leroy-Synephrine nasal spray.  May add oral steroid extension for sinus congestion pressure cough and inflammation.  Recommend daily non sedating antihistamine Claritin Zyrtec or loratadine over the next 1-2 weeks for nasal allergies with Benadryl nightly if needed for severe nasal allergies.    Recommend follow-up with primary care physician or ENT in the next 1-2 weeks for re-evaluation if not improving.  Acute maxillary sinusitis, recurrence not specified    Other orders  -     doxycycline (VIBRAMYCIN) 100 MG Cap; Take 1 capsule (100 mg total) by mouth 2 (two) times daily. for 10 days  Dispense: 20 capsule; Refill: 0  -     predniSONE (DELTASONE) 10 MG tablet; Take 1 tablet (10 mg total) by mouth once daily. for 5 days  Dispense: 5 tablet; Refill: 0  -     promethazine-dextromethorphan (PROMETHAZINE-DM) 6.25-15 mg/5 mL Syrp; Take 5 mLs by mouth every 8 (eight) hours as needed (cough).  Dispense: 118 mL; Refill: 0

## 2024-12-03 ENCOUNTER — OFFICE VISIT (OUTPATIENT)
Dept: URGENT CARE | Facility: CLINIC | Age: 39
End: 2024-12-03
Payer: COMMERCIAL

## 2024-12-03 VITALS
OXYGEN SATURATION: 100 % | DIASTOLIC BLOOD PRESSURE: 80 MMHG | SYSTOLIC BLOOD PRESSURE: 124 MMHG | HEART RATE: 77 BPM | BODY MASS INDEX: 49.08 KG/M2 | RESPIRATION RATE: 18 BRPM | TEMPERATURE: 97 F | WEIGHT: 250 LBS | HEIGHT: 60 IN

## 2024-12-03 DIAGNOSIS — J20.9 ACUTE BRONCHITIS, UNSPECIFIED ORGANISM: ICD-10-CM

## 2024-12-03 DIAGNOSIS — J01.90 ACUTE SINUSITIS, RECURRENCE NOT SPECIFIED, UNSPECIFIED LOCATION: Primary | ICD-10-CM

## 2024-12-03 DIAGNOSIS — R05.9 COUGH, UNSPECIFIED TYPE: ICD-10-CM

## 2024-12-03 PROCEDURE — 99213 OFFICE O/P EST LOW 20 MIN: CPT | Mod: ,,, | Performed by: PHYSICIAN ASSISTANT

## 2024-12-03 RX ORDER — CEFDINIR 300 MG/1
300 CAPSULE ORAL 2 TIMES DAILY
Qty: 20 CAPSULE | Refills: 0 | Status: SHIPPED | OUTPATIENT
Start: 2024-12-03 | End: 2024-12-13

## 2024-12-03 NOTE — PROGRESS NOTES
Subjective:      Patient ID: Jeanna Escobedo is a 39 y.o. female.    Vitals:  height is 5' (1.524 m) and weight is 113.4 kg (250 lb). Her tympanic temperature is 97.2 °F (36.2 °C). Her blood pressure is 124/80 and her pulse is 77. Her respiration is 18 and oxygen saturation is 100%.     Chief Complaint: Cough     Patient is a 39 y.o. female who presents to urgent care with complaints of hoarseness, cough, SOB, chest discomfort with coughing, low-grade fever, body aches, and right earache x several weeks. Patient was seen on 11/21 for sinusitis; finished doxycycline yesterday.       ROS   Objective:     Physical Exam   Constitutional: She is oriented to person, place, and time. She appears well-developed. She is cooperative.  Non-toxic appearance. She does not appear ill. No distress.   HENT:   Head: Normocephalic and atraumatic.   Ears:   Right Ear: Hearing, tympanic membrane, external ear and ear canal normal.   Left Ear: Hearing, tympanic membrane, external ear and ear canal normal.   Nose: Nose normal. No nasal deformity. No epistaxis.   Mouth/Throat: Uvula is midline, oropharynx is clear and moist and mucous membranes are normal. No trismus in the jaw. Normal dentition. No uvula swelling. No oropharyngeal exudate, posterior oropharyngeal edema or posterior oropharyngeal erythema.   Eyes: Conjunctivae and lids are normal. No scleral icterus.   Neck: Trachea normal and phonation normal. Neck supple. No edema present. No erythema present. No neck rigidity present.   Cardiovascular: Normal rate, regular rhythm, normal heart sounds and normal pulses.   Pulmonary/Chest: Effort normal and breath sounds normal. No respiratory distress. She has no decreased breath sounds. She has no rhonchi.   Abdominal: Normal appearance.   Musculoskeletal: Normal range of motion.         General: No deformity. Normal range of motion.   Neurological: She is alert and oriented to person, place, and time. She exhibits normal muscle  tone. Coordination normal.   Skin: Skin is warm, dry, intact, not diaphoretic and not pale.   Psychiatric: Her speech is normal and behavior is normal. Judgment and thought content normal.   Nursing note and vitals reviewed.    Details    Reading Physician Reading Date Result Priority   Giovanni Frederick MD  [phone] 12/3/2024 STAT     Narrative & Impression  EXAMINATION:  XR CHEST PA AND LATERAL     CLINICAL HISTORY:  Cough, unspecified     COMPARISON:  No priors     FINDINGS:  PA and lateral views of the chest were obtained. Heart and mediastinum within normal limits. The lungs are clear. No pneumothorax or significant effusion.     Impression:     No acute cardiopulmonary abnormality.        Electronically signed by:Giovanni Frederick  Date:                                            12/03/2024           Previous History      Review of patient's allergies indicates:   Allergen Reactions    Omeprazole Itching and Swelling    Codeine Itching    Nexium [esomeprazole magnesium] Hives    Nitrofurantoin Hives and Itching    Nitrofurantoin macrocrystal     Penicillins Itching    Tramadol Itching       Past Medical History:   Diagnosis Date    Disorder of thyroid, unspecified     GERD (gastroesophageal reflux disease)      Current Outpatient Medications   Medication Instructions    ARMOUR THYROID 90 mg, Daily    ascorbic acid (vitamin C) (VITAMIN C) 500 mg, Daily    cefdinir (OMNICEF) 300 mg, Oral, 2 times daily    fluticasone propionate (FLONASE) 50 mcg/actuation nasal spray 1 spray, 2 times daily    hydroCHLOROthiazide (HYDRODIURIL) 12.5 mg, Every morning    pantoprazole (PROTONIX) 40 mg, Every morning    vitamin D (VITAMIN D3) 1,000 Units, Daily    zinc sulfate (ZINC-220 ORAL) Take by mouth.     Past Surgical History:   Procedure Laterality Date    ADENOIDECTOMY      THYROIDECTOMY, PARTIAL      TONSILLECTOMY      ULNAR NERVE REPAIR Right     ULNAR NERVE REPAIR Right      Family History   Problem Relation Name Age of  Onset    No Known Problems Mother      No Known Problems Father         Social History     Tobacco Use    Smoking status: Never     Passive exposure: Never    Smokeless tobacco: Never   Substance Use Topics    Alcohol use: Never    Drug use: Never        Physical Exam      Vital Signs Reviewed   /80 (Patient Position: Sitting)   Pulse 77   Temp 97.2 °F (36.2 °C) (Tympanic)   Resp 18   Ht 5' (1.524 m)   Wt 113.4 kg (250 lb)   SpO2 100%   BMI 48.82 kg/m²        Procedures    Procedures     Labs     Results for orders placed or performed in visit on 11/16/24   POCT Influenza A/B Molecular    Collection Time: 11/16/24  1:32 PM   Result Value Ref Range    POC Molecular Influenza A Ag Negative Negative    POC Molecular Influenza B Ag Negative Negative     Acceptable Yes    POCT Strep A, Molecular    Collection Time: 11/16/24  1:32 PM   Result Value Ref Range    Molecular Strep A, POC Negative Negative     Acceptable Yes    SARS Coronavirus 2 Antigen, POCT Manual Read    Collection Time: 11/16/24  1:32 PM   Result Value Ref Range    SARS Coronavirus 2 Antigen Negative Negative     Acceptable Yes      Assessment:     1. Acute sinusitis, recurrence not specified, unspecified location    2. Cough, unspecified type    3. Acute bronchitis, unspecified organism        Plan:       Acute sinusitis, recurrence not specified, unspecified location    Cough, unspecified type  -     X-Ray Chest PA And Lateral; Future; Expected date: 12/03/2024    Acute bronchitis, unspecified organism    Other orders  -     cefdinir (OMNICEF) 300 MG capsule; Take 1 capsule (300 mg total) by mouth 2 (two) times daily. for 10 days  Dispense: 20 capsule; Refill: 0    Drink plenty of fluids.     Get plenty of rest.     Tylenol or Motrin as needed.     Go to the ER with any significant change or worsening of symptoms.     Follow up with your primary care doctor.

## 2024-12-26 ENCOUNTER — OFFICE VISIT (OUTPATIENT)
Dept: URGENT CARE | Facility: CLINIC | Age: 39
End: 2024-12-26
Payer: COMMERCIAL

## 2024-12-26 VITALS
WEIGHT: 250 LBS | TEMPERATURE: 98 F | DIASTOLIC BLOOD PRESSURE: 83 MMHG | BODY MASS INDEX: 49.08 KG/M2 | SYSTOLIC BLOOD PRESSURE: 121 MMHG | RESPIRATION RATE: 20 BRPM | HEIGHT: 60 IN | HEART RATE: 70 BPM | OXYGEN SATURATION: 98 %

## 2024-12-26 DIAGNOSIS — H92.02 LEFT EAR PAIN: Primary | ICD-10-CM

## 2024-12-26 PROCEDURE — 99213 OFFICE O/P EST LOW 20 MIN: CPT | Mod: ,,, | Performed by: PHYSICIAN ASSISTANT

## 2024-12-26 NOTE — PROGRESS NOTES
Subjective:      Patient ID: Jeanna Escobedo is a 39 y.o. female.    Vitals:  height is 5' (1.524 m) and weight is 113.4 kg (250 lb). Her tympanic temperature is 97.8 °F (36.6 °C). Her blood pressure is 121/83 and her pulse is 70. Her respiration is 20 and oxygen saturation is 98%.     Chief Complaint: Otalgia     Patient is a 39 y.o. female who presents to urgent care with complaints of left sided otalgia x 3 days.  His runny nose nasal congestion sinus pressure fever pain with chewing.  Alleviating factors include aleve with no relief. Patient denies N/V/D cough.      ROS   Objective:     Physical Exam   Constitutional: She is oriented to person, place, and time. She appears well-developed. She is cooperative.  Non-toxic appearance. She does not appear ill. No distress.   HENT:   Head: Normocephalic and atraumatic.   Ears:   Right Ear: Hearing, tympanic membrane, external ear and ear canal normal.   Left Ear: Hearing, tympanic membrane, external ear and ear canal normal.   Nose: Nose normal. No nasal deformity. No epistaxis.   Mouth/Throat: Uvula is midline, oropharynx is clear and moist and mucous membranes are normal. No trismus in the jaw. Normal dentition. No uvula swelling. No oropharyngeal exudate, posterior oropharyngeal edema or posterior oropharyngeal erythema.   Eyes: Conjunctivae and lids are normal. No scleral icterus.   Neck: Trachea normal and phonation normal. Neck supple. No edema present. No erythema present. No neck rigidity present.   Cardiovascular: Normal rate, regular rhythm, normal heart sounds and normal pulses.   Pulmonary/Chest: Effort normal and breath sounds normal. No respiratory distress. She has no decreased breath sounds. She has no rhonchi.   Abdominal: Normal appearance.   Musculoskeletal: Normal range of motion.         General: No deformity. Normal range of motion.   Neurological: She is alert and oriented to person, place, and time. She exhibits normal muscle tone.  Coordination normal.   Skin: Skin is warm, dry, intact, not diaphoretic and not pale.   Psychiatric: Her speech is normal and behavior is normal. Judgment and thought content normal.   Nursing note and vitals reviewed.    The patient reports that she did not take her prescription of prednisone in November when it was prescribed.  I discussed with her that her ear exam appears normal on that the pain could be radiation of pain from nearby areas.  She will start anti-inflammatory medication monitor her symptoms.  Follow up as needed.     Previous History      Review of patient's allergies indicates:   Allergen Reactions    Omeprazole Itching and Swelling    Codeine Itching    Nexium [esomeprazole magnesium] Hives    Nitrofurantoin Hives and Itching    Nitrofurantoin macrocrystal     Penicillins Itching    Tramadol Itching       Past Medical History:   Diagnosis Date    Disorder of thyroid, unspecified     GERD (gastroesophageal reflux disease)      Current Outpatient Medications   Medication Instructions    ARMOUR THYROID 90 mg, Daily    ascorbic acid (vitamin C) (VITAMIN C) 500 mg, Daily    fluticasone propionate (FLONASE) 50 mcg/actuation nasal spray 1 spray, 2 times daily    hydroCHLOROthiazide (HYDRODIURIL) 12.5 mg, Every morning    pantoprazole (PROTONIX) 40 mg, Every morning    vitamin D (VITAMIN D3) 1,000 Units, Daily    zinc sulfate (ZINC-220 ORAL) Take by mouth.     Past Surgical History:   Procedure Laterality Date    ADENOIDECTOMY      THYROIDECTOMY, PARTIAL      TONSILLECTOMY      ULNAR NERVE REPAIR Right     ULNAR NERVE REPAIR Right      Family History   Problem Relation Name Age of Onset    No Known Problems Mother      No Known Problems Father         Social History     Tobacco Use    Smoking status: Never     Passive exposure: Never    Smokeless tobacco: Never   Substance Use Topics    Alcohol use: Never    Drug use: Never        Physical Exam      Vital Signs Reviewed   /83   Pulse 70   Temp  97.8 °F (36.6 °C) (Tympanic)   Resp 20   Ht 5' (1.524 m)   Wt 113.4 kg (250 lb)   SpO2 98%   BMI 48.82 kg/m²        Procedures    Procedures     Labs     Results for orders placed or performed in visit on 11/16/24   POCT Influenza A/B Molecular    Collection Time: 11/16/24  1:32 PM   Result Value Ref Range    POC Molecular Influenza A Ag Negative Negative    POC Molecular Influenza B Ag Negative Negative     Acceptable Yes    POCT Strep A, Molecular    Collection Time: 11/16/24  1:32 PM   Result Value Ref Range    Molecular Strep A, POC Negative Negative     Acceptable Yes    SARS Coronavirus 2 Antigen, POCT Manual Read    Collection Time: 11/16/24  1:32 PM   Result Value Ref Range    SARS Coronavirus 2 Antigen Negative Negative     Acceptable Yes        Assessment:     1. Left ear pain        Plan:       Left ear pain      Aleve as directed.     Prednisone.     Follow up as needed.

## 2025-02-23 ENCOUNTER — OFFICE VISIT (OUTPATIENT)
Dept: URGENT CARE | Facility: CLINIC | Age: 40
End: 2025-02-23
Payer: COMMERCIAL

## 2025-02-23 VITALS
SYSTOLIC BLOOD PRESSURE: 130 MMHG | WEIGHT: 250 LBS | HEIGHT: 60 IN | DIASTOLIC BLOOD PRESSURE: 87 MMHG | BODY MASS INDEX: 49.08 KG/M2 | OXYGEN SATURATION: 99 % | TEMPERATURE: 98 F | RESPIRATION RATE: 18 BRPM | HEART RATE: 89 BPM

## 2025-02-23 DIAGNOSIS — U07.1 COVID-19: ICD-10-CM

## 2025-02-23 DIAGNOSIS — R05.9 COUGH, UNSPECIFIED TYPE: Primary | ICD-10-CM

## 2025-02-23 DIAGNOSIS — R50.9 FEVER, UNSPECIFIED FEVER CAUSE: ICD-10-CM

## 2025-02-23 LAB
CTP QC/QA: YES
CTP QC/QA: YES
POC MOLECULAR INFLUENZA A AGN: NEGATIVE
POC MOLECULAR INFLUENZA B AGN: NEGATIVE
SARS CORONAVIRUS 2 ANTIGEN: POSITIVE

## 2025-02-23 PROCEDURE — 87502 INFLUENZA DNA AMP PROBE: CPT | Mod: QW,,, | Performed by: PHYSICIAN ASSISTANT

## 2025-02-23 PROCEDURE — 87811 SARS-COV-2 COVID19 W/OPTIC: CPT | Mod: QW,,, | Performed by: PHYSICIAN ASSISTANT

## 2025-02-23 PROCEDURE — 99213 OFFICE O/P EST LOW 20 MIN: CPT | Mod: ,,, | Performed by: PHYSICIAN ASSISTANT

## 2025-02-23 NOTE — LETTER
February 23, 2025      [facility]  [address] [address]  Phone: [phone]       Patient: Jeanna Escobedo   YOB: 1985  Date of Visit: 02/23/2025    To Whom It May Concern:    Chris Escobedo  was at [facility] on 02/23/2025. The patient may return to work/school on 02/27/2025 with no restrictions. If you have any questions or concerns, or if I can be of further assistance, please do not hesitate to contact me.    Sincerely,    Donal Martinez MA

## 2025-02-23 NOTE — PROGRESS NOTES
Subjective:      Patient ID: Jeanna Escobedo is a 39 y.o. female.    Vitals:  height is 5' (1.524 m) and weight is 113.4 kg (250 lb). Her oral temperature is 97.8 °F (36.6 °C). Her blood pressure is 130/87 and her pulse is 89. Her respiration is 18 and oxygen saturation is 99%.     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  Patient reports son positive flu sick contacts in household over the past week and co-worker positive flu sick contact.      Constitution: Positive for fatigue and fever. Negative for sweating.   HENT:  Positive for congestion, sinus pressure and sore throat. Negative for ear pain, sinus pain, trouble swallowing and voice change.    Neck: Negative for neck pain and neck swelling.   Cardiovascular:  Negative for chest pain.   Respiratory:  Positive for cough. Negative for shortness of breath, stridor and wheezing.    Gastrointestinal: Negative.    Musculoskeletal:  Positive for muscle ache.   Skin: Negative.    Allergic/Immunologic: Negative.  Positive for sneezing.   Neurological:  Positive for headaches. Negative for altered mental status.   Psychiatric/Behavioral:  Negative for altered mental status.       Objective:     Physical Exam   Constitutional: She is oriented to person, place, and time. She appears well-developed. She is cooperative.  Non-toxic appearance.      Comments:Awake alert ambulatory fatigued nasally congested female speaks in complete sentences   obesity  HENT:   Head: Normocephalic.   Ears:   Right Ear: Hearing, tympanic membrane, external ear and ear canal normal. Tympanic membrane is not erythematous and not bulging.   Left Ear: Hearing, tympanic membrane, external ear and ear canal normal. Tympanic membrane is not erythematous and not  bulging.   Nose: Mucosal edema and congestion present. No rhinorrhea, purulent discharge or nasal deformity. No epistaxis. Right sinus exhibits no maxillary sinus tenderness and no frontal sinus tenderness. Left sinus exhibits no maxillary sinus tenderness and no frontal sinus tenderness.   Mouth/Throat: Uvula is midline, oropharynx is clear and moist and mucous membranes are normal. Mucous membranes are moist. No trismus in the jaw. Normal dentition. No uvula swelling. No oropharyngeal exudate, posterior oropharyngeal edema or posterior oropharyngeal erythema.      Comments: No edema, no palate petechiae, no muffled voice  Eyes: Conjunctivae and lids are normal. No scleral icterus.   Neck: Trachea normal and phonation normal. Neck supple. No edema present. No erythema present. No neck rigidity present.   Cardiovascular: Normal rate, regular rhythm, normal heart sounds and normal pulses.   Pulmonary/Chest: Effort normal and breath sounds normal. No respiratory distress. She has no decreased breath sounds. She has no rhonchi.   Musculoskeletal: Normal range of motion.         General: No swelling. Normal range of motion.      Cervical back: She exhibits no tenderness.   Lymphadenopathy:     She has no cervical adenopathy.   Neurological: no focal deficit. She is alert and oriented to person, place, and time. She exhibits normal muscle tone. Coordination normal.   Skin: Skin is warm, dry, intact, not diaphoretic and not pale.   Psychiatric: Her speech is normal and behavior is normal. Judgment and thought content normal.   Nursing note and vitals reviewed.         Previous History      Review of patient's allergies indicates:   Allergen Reactions    Omeprazole Itching and Swelling    Codeine Itching    Nexium [esomeprazole magnesium] Hives    Nitrofurantoin Hives and Itching    Nitrofurantoin macrocrystal     Penicillins Itching    Tramadol Itching       Past Medical History:   Diagnosis Date    Disorder of thyroid,  unspecified     GERD (gastroesophageal reflux disease)      Current Outpatient Medications   Medication Instructions    ARMOUR THYROID 90 mg, Daily    ascorbic acid (vitamin C) (VITAMIN C) 500 mg, Daily    fluticasone propionate (FLONASE) 50 mcg/actuation nasal spray 1 spray, 2 times daily    hydroCHLOROthiazide 12.5 mg, Every morning    nirmatrelvir-ritonavir 300 mg (150 mg x 2)-100 mg copackaged tablets (EUA) Take 3 tablets by mouth 2 (two) times daily. Each dose contains 2 nirmatrelvir (pink tablets) and 1 ritonavir (white tablet). Take all 3 tablets together    pantoprazole (PROTONIX) 40 mg, Every morning    vitamin D (VITAMIN D3) 1,000 Units, Daily    zinc sulfate (ZINC-220 ORAL) Take by mouth.     Past Surgical History:   Procedure Laterality Date    ADENOIDECTOMY      THYROIDECTOMY, PARTIAL      TONSILLECTOMY      ULNAR NERVE REPAIR Right     ULNAR NERVE REPAIR Right      Family History   Problem Relation Name Age of Onset    No Known Problems Mother      No Known Problems Father         Social History[1]     Physical Exam      Vital Signs Reviewed   /87   Pulse 89   Temp 97.8 °F (36.6 °C) (Oral)   Resp 18   Ht 5' (1.524 m)   Wt 113.4 kg (250 lb)   SpO2 99%   BMI 48.82 kg/m²        Procedures    Procedures     Labs     Results for orders placed or performed in visit on 02/23/25   SARS Coronavirus 2 Antigen, POCT Manual Read    Collection Time: 02/23/25 12:41 PM   Result Value Ref Range    SARS Coronavirus 2 Antigen Positive (A) Negative, Presumptive Negative     Acceptable Yes    POCT Influenza A/B MOLECULAR    Collection Time: 02/23/25 12:44 PM   Result Value Ref Range    POC Molecular Influenza A Ag Negative Negative    POC Molecular Influenza B Ag Negative Negative     Acceptable Yes        Assessment:     1. Cough, unspecified type    2. Fever, unspecified fever cause    3. COVID-19        Plan:   Patient understands positive COVID testing negative flu testing.   Patient reports having previously prescribed Phenergan DM and oral steroid sufficient not need of new prescription.  Patient understands discharge plan with symptomatic Rx Paxlovid paper prescription pharmaceutical discount code with paperwork today and provided work note.  Patient verbalized understanding and ready for discharge now    Positive COVID viral testing today.  Negative flu testing.  High concern for acute viral respiratory illness.    May anterior pharmaceutical code for discounted medication and start Paxlovid to help reduce viral sick time.  Recommend continue rotating Tylenol and ibuprofen every 4-6 hours if needed for aches pains fever or chills.  Encouraged plenty of water and noncarbonated fluids hydration rest over the next 5-7 days.  Recommend Delsym Robitussin Dimetapp cough syrup or personal prescription Phenergan DM as needed for cough or congestion.  Not take sedating medication drive or operate machinery.  Cover cough at all times washing sanitized hands and surfaces regularly.  May add personal previously prescribed oral steroid in the morning after breakfast if needed for cough congestion body aches and inflammation at your discretion.    Recommend follow-up with primary care in the next week for re-evaluation if not improving.  Recommended emergency department evaluation immediately if respiratory symptoms worsen  Cough, unspecified type  -     POCT Influenza A/B MOLECULAR  -     SARS Coronavirus 2 Antigen, POCT Manual Read    Fever, unspecified fever cause  -     POCT Influenza A/B MOLECULAR  -     SARS Coronavirus 2 Antigen, POCT Manual Read    COVID-19    Other orders  -     nirmatrelvir-ritonavir 300 mg (150 mg x 2)-100 mg copackaged tablets (EUA); Take 3 tablets by mouth 2 (two) times daily. Each dose contains 2 nirmatrelvir (pink tablets) and 1 ritonavir (white tablet). Take all 3 tablets together  Dispense: 30 tablet; Refill: 0                         [1]   Social History  Tobacco  Use    Smoking status: Never     Passive exposure: Never    Smokeless tobacco: Never   Substance Use Topics    Alcohol use: Never    Drug use: Never

## 2025-02-23 NOTE — PATIENT INSTRUCTIONS
Positive COVID viral testing today.  Negative flu testing.  High concern for acute viral respiratory illness.    May anterior pharmaceutical code for discounted medication and start Paxlovid to help reduce viral sick time.  Recommend continue rotating Tylenol and ibuprofen every 4-6 hours if needed for aches pains fever or chills.  Encouraged plenty of water and noncarbonated fluids hydration rest over the next 5-7 days.  Recommend Delsym Robitussin Dimetapp cough syrup or personal prescription Phenergan DM as needed for cough or congestion.  Not take sedating medication drive or operate machinery.  Cover cough at all times washing sanitized hands and surfaces regularly.  May add personal previously prescribed oral steroid in the morning after breakfast if needed for cough congestion body aches and inflammation at your discretion.    Recommend follow-up with primary care in the next week for re-evaluation if not improving.  Recommended emergency department evaluation immediately if respiratory symptoms worsen

## 2025-04-07 ENCOUNTER — OFFICE VISIT (OUTPATIENT)
Dept: URGENT CARE | Facility: CLINIC | Age: 40
End: 2025-04-07
Payer: COMMERCIAL

## 2025-04-07 ENCOUNTER — TELEPHONE (OUTPATIENT)
Dept: URGENT CARE | Facility: CLINIC | Age: 40
End: 2025-04-07

## 2025-04-07 VITALS
BODY MASS INDEX: 49.08 KG/M2 | OXYGEN SATURATION: 98 % | HEART RATE: 90 BPM | RESPIRATION RATE: 20 BRPM | TEMPERATURE: 98 F | SYSTOLIC BLOOD PRESSURE: 104 MMHG | HEIGHT: 60 IN | DIASTOLIC BLOOD PRESSURE: 73 MMHG | WEIGHT: 250 LBS

## 2025-04-07 DIAGNOSIS — R19.7 DIARRHEA, UNSPECIFIED TYPE: Primary | ICD-10-CM

## 2025-04-07 PROCEDURE — 99213 OFFICE O/P EST LOW 20 MIN: CPT | Mod: ,,, | Performed by: PHYSICIAN ASSISTANT

## 2025-04-07 RX ORDER — DICYCLOMINE HYDROCHLORIDE 10 MG/1
10 CAPSULE ORAL 3 TIMES DAILY
Qty: 15 CAPSULE | Refills: 0 | Status: SHIPPED | OUTPATIENT
Start: 2025-04-07 | End: 2025-04-12

## 2025-04-07 NOTE — PROGRESS NOTES
Subjective:      Patient ID: Jeanna Escobedo is a 40 y.o. female.    Vitals:  height is 5' (1.524 m) and weight is 113.4 kg (250 lb). Her tympanic temperature is 97.9 °F (36.6 °C). Her blood pressure is 104/73 and her pulse is 90. Her respiration is 20 and oxygen saturation is 98%.     Chief Complaint: Diarrhea     Patient is a 40 y.o. female who presents to urgent care with complaints of abdominal and diarrhea x3 days.  She reports having fever chills and body aches on day 1 but no fever since.  She reports for diarrhea episodes in the last 24 hours.  She also reports that she has been exposed to individuals with similar symptoms.  Alleviating factors include imodium with minimal relief.  Patient denies vomiting, bloody stools, or URI symptoms.  She denies any antibiotic use in the last 2 months, recent foreign travel, or raw seafood consumption.      ROS   Objective:     Physical Exam   Constitutional: She is oriented to person, place, and time. She appears well-developed.   HENT:   Head: Normocephalic and atraumatic.   Ears:   Right Ear: External ear normal.   Left Ear: External ear normal.   Nose: Nose normal.   Mouth/Throat: Mucous membranes are normal. Mucous membranes are moist.   Eyes: Conjunctivae and lids are normal.   Neck: Trachea normal. Neck supple.   Cardiovascular: Normal rate, regular rhythm and normal heart sounds.   Pulmonary/Chest: Effort normal and breath sounds normal. No respiratory distress.   Abdominal: Normal appearance and bowel sounds are normal. She exhibits no distension and no mass. Soft. There is abdominal tenderness.   Musculoskeletal: Normal range of motion.         General: Normal range of motion.   Neurological: She is alert and oriented to person, place, and time. She has normal strength.   Skin: Skin is warm, dry, intact, not diaphoretic and not pale.   Psychiatric: Her speech is normal and behavior is normal. Judgment and thought content normal.   Nursing note and vitals  reviewed.  Mild generalized abdominal TTP without focal guarding.       Previous History      Review of patient's allergies indicates:   Allergen Reactions    Omeprazole Itching and Swelling    Codeine Itching    Nexium [esomeprazole magnesium] Hives    Nitrofurantoin Hives and Itching    Nitrofurantoin macrocrystal     Penicillins Itching    Tramadol Itching       Past Medical History:   Diagnosis Date    Disorder of thyroid, unspecified     GERD (gastroesophageal reflux disease)      Current Outpatient Medications   Medication Instructions    ARMOUR THYROID 90 mg, Daily    ascorbic acid (vitamin C) (VITAMIN C) 500 mg, Daily    fluticasone propionate (FLONASE) 50 mcg/actuation nasal spray 1 spray, 2 times daily    hydroCHLOROthiazide 12.5 mg, Every morning    nirmatrelvir-ritonavir 300 mg (150 mg x 2)-100 mg copackaged tablets (EUA) Take 3 tablets by mouth 2 (two) times daily. Each dose contains 2 nirmatrelvir (pink tablets) and 1 ritonavir (white tablet). Take all 3 tablets together    pantoprazole (PROTONIX) 40 mg, Every morning    vitamin D (VITAMIN D3) 1,000 Units, Daily    zinc sulfate (ZINC-220 ORAL) Take by mouth.     Past Surgical History:   Procedure Laterality Date    ADENOIDECTOMY      THYROIDECTOMY, PARTIAL      TONSILLECTOMY      ULNAR NERVE REPAIR Right     ULNAR NERVE REPAIR Right      Family History   Problem Relation Name Age of Onset    No Known Problems Mother      No Known Problems Father         Social History[1]     Physical Exam      Vital Signs Reviewed   /73   Pulse 90   Temp 97.9 °F (36.6 °C) (Tympanic)   Resp 20   Ht 5' (1.524 m)   Wt 113.4 kg (250 lb)   SpO2 98%   BMI 48.82 kg/m²        Procedures    Procedures     Labs     Results for orders placed or performed in visit on 02/23/25   SARS Coronavirus 2 Antigen, POCT Manual Read    Collection Time: 02/23/25 12:41 PM   Result Value Ref Range    SARS Coronavirus 2 Antigen Positive (A) Negative, Presumptive Negative    Quality  Control Acceptable Yes    POCT Influenza A/B MOLECULAR    Collection Time: 02/23/25 12:44 PM   Result Value Ref Range    POC Molecular Influenza A Ag Negative Negative    POC Molecular Influenza B Ag Negative Negative     Acceptable Yes      Assessment:     1. Diarrhea, unspecified type        Plan:       Diarrhea, unspecified type      Increase fluid intake and monitor for signs of dehydration including dark colored urine, weakness, lethargy, dizziness, etc.   Get plenty of rest.   BRAT Diet: Begin eating a BRAT diet as tolerated (bananas, plain rice, apple sauce, plain toast).  Fever / Body Aches: Take OTC Tylenol or Motrin per package instructions as needed.   Follow-up with your Primary Care Provider as needed.   Present to the nearest Emergency Department with any significant change or worsening symptoms.                     [1]   Social History  Tobacco Use    Smoking status: Never     Passive exposure: Never    Smokeless tobacco: Never   Substance Use Topics    Alcohol use: Never    Drug use: Never

## 2025-04-07 NOTE — PATIENT INSTRUCTIONS
Increase fluid intake and monitor for signs of dehydration including dark colored urine, weakness, lethargy, dizziness, etc.   Get plenty of rest.   BRAT Diet: Begin eating a BRAT diet as tolerated (bananas, plain rice, apple sauce, plain toast).  Fever / Body Aches: Take OTC Tylenol or Motrin per package instructions as needed.   Follow-up with your Primary Care Provider as needed.   Present to the nearest Emergency Department with any significant change or worsening symptoms.

## 2025-09-04 ENCOUNTER — OFFICE VISIT (OUTPATIENT)
Dept: URGENT CARE | Facility: CLINIC | Age: 40
End: 2025-09-04
Payer: COMMERCIAL

## 2025-09-04 VITALS
TEMPERATURE: 97 F | OXYGEN SATURATION: 100 % | BODY MASS INDEX: 49.08 KG/M2 | WEIGHT: 250 LBS | HEART RATE: 67 BPM | RESPIRATION RATE: 18 BRPM | DIASTOLIC BLOOD PRESSURE: 65 MMHG | HEIGHT: 60 IN | SYSTOLIC BLOOD PRESSURE: 131 MMHG

## 2025-09-04 DIAGNOSIS — J01.90 ACUTE SINUSITIS, RECURRENCE NOT SPECIFIED, UNSPECIFIED LOCATION: Primary | ICD-10-CM

## 2025-09-04 PROCEDURE — 99213 OFFICE O/P EST LOW 20 MIN: CPT | Mod: ,,, | Performed by: PHYSICIAN ASSISTANT

## 2025-09-04 RX ORDER — DOXYCYCLINE 100 MG/1
100 CAPSULE ORAL EVERY 12 HOURS
Qty: 14 CAPSULE | Refills: 0 | Status: SHIPPED | OUTPATIENT
Start: 2025-09-04 | End: 2025-09-11

## 2025-09-04 RX ORDER — PREDNISONE 20 MG/1
20 TABLET ORAL 2 TIMES DAILY
Qty: 10 TABLET | Refills: 0 | Status: SHIPPED | OUTPATIENT
Start: 2025-09-04 | End: 2025-09-09